# Patient Record
Sex: MALE | Race: WHITE | Employment: FULL TIME | URBAN - METROPOLITAN AREA
[De-identification: names, ages, dates, MRNs, and addresses within clinical notes are randomized per-mention and may not be internally consistent; named-entity substitution may affect disease eponyms.]

---

## 2018-01-16 ENCOUNTER — TELEPHONE (OUTPATIENT)
Dept: FAMILY MEDICINE | Facility: CLINIC | Age: 38
End: 2018-01-16

## 2018-01-16 ENCOUNTER — OFFICE VISIT (OUTPATIENT)
Dept: FAMILY MEDICINE | Facility: CLINIC | Age: 38
End: 2018-01-16

## 2018-01-16 VITALS
HEART RATE: 115 BPM | WEIGHT: 210 LBS | SYSTOLIC BLOOD PRESSURE: 125 MMHG | DIASTOLIC BLOOD PRESSURE: 82 MMHG | RESPIRATION RATE: 22 BRPM | OXYGEN SATURATION: 97 % | BODY MASS INDEX: 25.57 KG/M2 | TEMPERATURE: 100.4 F | HEIGHT: 76 IN

## 2018-01-16 DIAGNOSIS — R53.83 FATIGUE, UNSPECIFIED TYPE: ICD-10-CM

## 2018-01-16 DIAGNOSIS — R21 RASH AND NONSPECIFIC SKIN ERUPTION: ICD-10-CM

## 2018-01-16 DIAGNOSIS — R59.0 ADENOPATHY, CERVICAL: ICD-10-CM

## 2018-01-16 DIAGNOSIS — R07.0 THROAT PAIN: Primary | ICD-10-CM

## 2018-01-16 DIAGNOSIS — D72.828 NEUTROPHILIA: ICD-10-CM

## 2018-01-16 LAB
BASOPHILS # BLD AUTO: 0 10E9/L (ref 0–0.2)
BASOPHILS NFR BLD AUTO: 0.2 %
DEPRECATED S PYO AG THROAT QL EIA: NORMAL
DIFFERENTIAL METHOD BLD: ABNORMAL
EOSINOPHIL # BLD AUTO: 0.1 10E9/L (ref 0–0.7)
EOSINOPHIL NFR BLD AUTO: 0.4 %
ERYTHROCYTE [DISTWIDTH] IN BLOOD BY AUTOMATED COUNT: 13.7 % (ref 10–15)
FLUAV+FLUBV AG SPEC QL: NEGATIVE
FLUAV+FLUBV AG SPEC QL: NEGATIVE
HCT VFR BLD AUTO: 42.4 % (ref 40–53)
HGB BLD-MCNC: 14.4 G/DL (ref 13.3–17.7)
LYMPHOCYTES # BLD AUTO: 1.5 10E9/L (ref 0.8–5.3)
LYMPHOCYTES NFR BLD AUTO: 9.4 %
MCH RBC QN AUTO: 32.3 PG (ref 26.5–33)
MCHC RBC AUTO-ENTMCNC: 34 G/DL (ref 31.5–36.5)
MCV RBC AUTO: 95 FL (ref 78–100)
MONOCYTES # BLD AUTO: 1.9 10E9/L (ref 0–1.3)
MONOCYTES NFR BLD AUTO: 11.8 %
NEUTROPHILS # BLD AUTO: 12.7 10E9/L (ref 1.6–8.3)
NEUTROPHILS NFR BLD AUTO: 78.2 %
PLATELET # BLD AUTO: 275 10E9/L (ref 150–450)
RBC # BLD AUTO: 4.46 10E12/L (ref 4.4–5.9)
SPECIMEN SOURCE: NORMAL
SPECIMEN SOURCE: NORMAL
WBC # BLD AUTO: 16.2 10E9/L (ref 4–11)

## 2018-01-16 PROCEDURE — 99214 OFFICE O/P EST MOD 30 MIN: CPT | Performed by: FAMILY MEDICINE

## 2018-01-16 PROCEDURE — 87081 CULTURE SCREEN ONLY: CPT | Performed by: FAMILY MEDICINE

## 2018-01-16 PROCEDURE — 87880 STREP A ASSAY W/OPTIC: CPT | Performed by: FAMILY MEDICINE

## 2018-01-16 PROCEDURE — 36415 COLL VENOUS BLD VENIPUNCTURE: CPT | Performed by: FAMILY MEDICINE

## 2018-01-16 PROCEDURE — 85025 COMPLETE CBC W/AUTO DIFF WBC: CPT | Performed by: FAMILY MEDICINE

## 2018-01-16 PROCEDURE — 87804 INFLUENZA ASSAY W/OPTIC: CPT | Performed by: FAMILY MEDICINE

## 2018-01-16 RX ORDER — CEFDINIR 300 MG/1
300 CAPSULE ORAL 2 TIMES DAILY
Qty: 20 CAPSULE | Refills: 0 | Status: SHIPPED | OUTPATIENT
Start: 2018-01-16 | End: 2019-03-04

## 2018-01-16 ASSESSMENT — PAIN SCALES - GENERAL: PAINLEVEL: SEVERE PAIN (6)

## 2018-01-16 NOTE — PROGRESS NOTES
"  SUBJECTIVE:   Karthik oSto is a 37 year old male who presents to clinic today for the following health issues:    ENT Symptoms             Symptoms: cc Present Absent Comment   Fever/Chills  x     Fatigue  x     Muscle Aches  x     Eye Irritation   x    Sneezing   x    Nasal Alexis/Drg   x    Sinus Pressure/Pain   x    Loss of smell   x    Dental pain   x    Sore Throat  x     Swollen Glands  x     Ear Pain/Fullness  x     Cough   x    Wheeze   x    Chest Pain   x    Shortness of breath  x     Rash  x     Other   x      Symptom duration:  x2 days    Symptom severity:  mild    Treatments tried:  OTC, Advil   Contacts:  None     Swollen glands (submandibular) x 1 week  Sore throat  Body aches  Rash: erythematous rash in trunk    Urination: normal color, drinking enough.  BM: Normal.    Problem list and histories reviewed & adjusted, as indicated.  Additional history: as documented    Patient Active Problem List   Diagnosis     CARDIOVASCULAR SCREENING; LDL GOAL LESS THAN 160     Genital herpes     Tobacco abuse     History reviewed. No pertinent surgical history.    Social History   Substance Use Topics     Smoking status: Current Every Day Smoker     Types: Cigarettes     Smokeless tobacco: Current User     Alcohol use Yes      Comment: occasional     Family History   Problem Relation Age of Onset     Hypertension Mother      Neurologic Disorder Father      Alzheimer Disease Maternal Grandmother      Hypertension Maternal Grandfather          Reviewed and updated as needed this visit by Provider    ROS:  Cardiovascular, pulmonary, gi and gu systems are negative, except as otherwise noted.    OBJECTIVE:   /82  Pulse 115  Temp 100.4  F (38  C) (Oral)  Resp 22  Ht 6' 3.59\" (1.92 m)  Wt 210 lb (95.3 kg)  SpO2 97%  BMI 25.84 kg/m2  Body mass index is 25.84 kg/(m^2).  GENERAL: healthy, alert and no distress  THROAT: Oropharyngeal erythema  NECK: non tender firm matted submandibular adenopathy.  RESP: lungs " clear to auscultation - no rales, rhonchi or wheezes  CV: regular rate and rhythm, no murmur, click or rub, no peripheral edema   ABDOMEN: soft, nontender, no masses and bowel sounds normal  MS: no gross musculoskeletal defects noted, no edema  Results for orders placed or performed in visit on 01/16/18   CBC with platelets differential   Result Value Ref Range    WBC 16.2 (H) 4.0 - 11.0 10e9/L    RBC Count 4.46 4.4 - 5.9 10e12/L    Hemoglobin 14.4 13.3 - 17.7 g/dL    Hematocrit 42.4 40.0 - 53.0 %    MCV 95 78 - 100 fl    MCH 32.3 26.5 - 33.0 pg    MCHC 34.0 31.5 - 36.5 g/dL    RDW 13.7 10.0 - 15.0 %    Platelet Count 275 150 - 450 10e9/L    Diff Method Automated Method     % Neutrophils 78.2 %    % Lymphocytes 9.4 %    % Monocytes 11.8 %    % Eosinophils 0.4 %    % Basophils 0.2 %    Absolute Neutrophil 12.7 (H) 1.6 - 8.3 10e9/L    Absolute Lymphocytes 1.5 0.8 - 5.3 10e9/L    Absolute Monocytes 1.9 (H) 0.0 - 1.3 10e9/L    Absolute Eosinophils 0.1 0.0 - 0.7 10e9/L    Absolute Basophils 0.0 0.0 - 0.2 10e9/L   Influenza A/B antigen   Result Value Ref Range    Influenza A/B Agn Specimen Nasal     Influenza A Negative NEG^Negative    Influenza B Negative NEG^Negative   Rapid strep screen   Result Value Ref Range    Specimen Description Throat     Rapid Strep A Screen       NEGATIVE: No Group A streptococcal antigen detected by immunoassay, await culture report.       ASSESSMENT/PLAN:     (R07.0) Throat pain  (primary encounter diagnosis)  Comment: Differentials: Strep, Mono, tonsillitis, Viral. RSS negative  Plan: Rapid strep screen, Beta strep group A culture,        CBC with platelets differential, cefdinir         (OMNICEF) 300 MG capsule    (R53.83) Fatigue  Comment: Influenza A/B negative  Plan: Influenza A/B antigen    (R21) Rash and nonspecific skin eruption  Comment: From infectious source, given fever, likely viral but with neutrophilia possibly bacterial. Asymptomatic, treat underlying cause  Plan: CBC with  platelets differential    (R59.0) Adenopathy, cervical  Comment: Infectious; viral vs bacterial or reactive.  Plan: Antibiotic treatment    (D72.9) Neutrophilia  Comment: Indicating a bacterial process. Discussed empiric treatment with Omnicef  Plan: cefdinir (OMNICEF) 300 MG capsule      Call or return to clinic prn if these symptoms worsen or fail to improve as anticipated in 3 days.  More than 50% of the time spent with patient on counseling / coordinating his care. Total appointment times was 30 minutes.     Mio Levine MD  [facility]

## 2018-01-16 NOTE — TELEPHONE ENCOUNTER
Patient needs visit, he has not been seen since 2014.  Called patient he has appointment and will come in for visit.   Amaris Linton RN

## 2018-01-16 NOTE — MR AVS SNAPSHOT
After Visit Summary   1/16/2018    Karthik Soto    MRN: [id number]           Patient Information     Date Of Birth          1980        Visit Information        Provider Department      1/16/2018 11:40 AM Mio Levine MD [facility]        Today's Diagnoses     Throat pain    -  1    Fatigue        Rash and nonspecific skin eruption        Adenopathy, cervical          Care Instructions      When You Have a Sore Throat    A sore throat can be painful. There are many reasons why you may have a sore throat. Your healthcare provider will work with you to find the cause of your sore throat. He or she will also find the best treatment for you.  What causes a sore throat?  Sore throats can be caused or worsened by:    Cold or flu viruses    Bacteria    Irritants such as tobacco smoke or air pollution    Acid reflux  A healthy throat  The tonsils are on the sides of the throat near the base of the tongue. They collect viruses and bacteria and help fight infection. The throat (pharynx) is the passage for air. Mucus from the nasal cavity also moves down the passage.  An inflamed throat  The tonsils and pharynx can become inflamed due to a cold or flu virus. Postnasal drip (excess mucus draining from the nasal cavity) can irritate the throat. It can also make the throat or tonsils more likely to be infected by bacteria. Severe, untreated tonsillitis in children or adults can cause a pocket of pus (abscess) to form near the tonsil.  Your evaluation  A medical evaluation can help find the cause of your sore throat. It can also help your healthcare provider choose the best treatment for you. The evaluation may include a health history, physical exam, and diagnostic tests.  Health history  Your healthcare provider may ask you the following:    How long has the sore throat lasted and how have you been treating it?    Do you have any other symptoms, such as body aches, fever, or  cough?    Does your sore throat recur? If so, how often? How many days of school or work have you missed because of a sore throat?    Do you have trouble eating or swallowing?    Have you been told that you snore or have other sleep problems?    Do you have bad breath?    Do you cough up bad-tasting mucus?  Physical exam  During the exam, your healthcare provider checks your ears, nose, and throat for problems. He or she also checks for swelling in the neck, and may listen to your chest.  Possible tests  Other tests your healthcare provider may perform include:    A throat swab to check for bacteria such as streptococcus (the bacteria that causes strep throat)    A blood test to check for mononucleosis (a viral infection)    A chest X-ray to rule out pneumonia, especially if you have a cough  Treating a sore throat  Treatment depends on many factors. What is the likely cause? Is the problem recent? Does it keep coming back? In many cases, the best thing to do is to treat the symptoms, rest, and let the problem heal itself. Antibiotics may help clear up some bacterial infections. For cases of severe or recurring tonsillitis, the tonsils may need to be removed.  Relieving your symptoms    Don t smoke, and avoid secondhand smoke.    For children, try throat sprays or Popsicles. Adults and older children may try lozenges.    Drink warm liquids to soothe the throat and help thin mucus. Avoid alcohol, spicy foods, and acidic drinks such as orange juice. These can irritate the throat.    Gargle with warm saltwater (1 teaspoon of salt to 8 ounces of warm water).    Use a humidifier to keep air moist and relieve throat dryness.    Try over-the-counter pain relievers such as acetaminophen or ibuprofen. Use as directed, and don t exceed the recommended dose. Don t give aspirin to children.   Are antibiotics needed?  If your sore throat is due to a bacterial infection, antibiotics may speed healing and prevent complications.  "Although group A streptococcus (\"strep throat\" or GAS) is the major treatable infection for a sore throat, GAS causes only 5% to 15% of sore throats in adults who seek medical care. Most sore throats are caused by cold or flu viruses. And antibiotics don t treat viral illness. In fact, using antibiotics when they re not needed may produce bacteria that are harder to kill. Your healthcare provider will prescribe antibiotics only if he or she thinks they are likely to help.  If antibiotics are prescribed  Take the medicine exactly as directed. Be sure to finish your prescription even if you re feeling better. And be sure to ask your healthcare provider or pharmacist what side effects are common and what to do about them.  Is surgery needed?  In some cases, tonsils need to be removed. This is often done as outpatient (same-day) surgery. Your healthcare provider may advise removing the tonsils in cases of:    Several severe bouts of tonsillitis in a year.  Severe  episodes include those that lead to missed days of school or work, or that need to be treated with antibiotics.    Tonsillitis that causes breathing problems during sleep    Tonsillitis caused by food particles collecting in pouches in the tonsils (cryptic tonsillitis)  Call your healthcare provider if any of the following occur:    Symptoms worsen, or new symptoms develop.    Swollen tonsils make breathing difficult.    The pain is severe enough to keep you from drinking liquids.    A skin rash, hives, or wheezing develops. Any of these could signal an allergic reaction to antibiotics.    Symptoms don t improve within a week.    Symptoms don t improve within 2 to 3 days of starting antibiotics.   Date Last Reviewed: 10/1/2016    0573-4900 [employer]. [address] [place] [address], [place] [address]. All rights reserved. This information is not intended as a substitute for professional medical care. Always follow your healthcare professional's " instructions.      [facility]    If you have any questions regarding to your visit please contact your care team:       Team Purple:   Clinic Hours Telephone Number   Dr. Eli Bates   7am-7pm  Monday - Thursday   7am-5pm  Fridays  [phone]  (Appointment scheduling available 24/7)    Questions about your Visit?   Team Line:  [phone]   Urgent Care - [place] and [facility] - 11am-9pm Monday-Friday Saturday-Sunday- 9am-5pm   [place] - 5pm-9pm Monday-Friday Saturday-Sunday- 9am-5pm  [phone] - [facility]  [phone] - [place]       What options do I have for visits at the clinic other than the traditional office visit?  To expand how we care for you, many of our providers are utilizing electronic visits (e-visits) and telephone visits, when medically appropriate, for interactions with their patients rather than a visit in the clinic.   We also offer nurse visits for many medical concerns. Just like any other service, we will bill your insurance company for this type of visit based on time spent on the phone with your provider. Not all insurance companies cover these visits. Please check with your medical insurance if this type of visit is covered. You will be responsible for any charges that are not paid by your insurance.      E-visits via CanoP:  generally incur a $35.00 fee.  Telephone visits:  Time spent on the phone: *charged based on time that is spent on the phone in increments of 10 minutes. Estimated cost:   5-10 mins $30.00   11-20 mins. $59.00   21-30 mins. $85.00     Use Yecurishart (secure email communication and access to your chart) to send your primary care provider a message or make an appointment. Ask someone on your Team how to sign up for CanoP.  For a Price Quote for your services, please call our Consumer Price Line at [phone].  As always, Thank you for trusting us with your health  "care needs!    Discharge by FRANCE NAVA             Follow-ups after your visit        Who to contact     If you have questions or need follow up information about today's clinic visit or your schedule please contact [facility] directly at [phone].  Normal or non-critical lab and imaging results will be communicated to you by MyChart, letter or phone within 4 business days after the clinic has received the results. If you do not hear from us within 7 days, please contact the clinic through MyChart or phone. If you have a critical or abnormal lab result, we will notify you by phone as soon as possible.  Submit refill requests through Mamapedia or call your pharmacy and they will forward the refill request to us. Please allow 3 business days for your refill to be completed.          Additional Information About Your Visit        MyCharSingWho Information     Mamapedia lets you send messages to your doctor, view your test results, renew your prescriptions, schedule appointments and more. To sign up, go to [email or web address] . Click on \"Log in\" on the left side of the screen, which will take you to the Welcome page. Then click on \"Sign up Now\" on the right side of the page.     You will be asked to enter the access code listed below, as well as some personal information. Please follow the directions to create your username and password.     Your access code is: [email or web address]  Expires: 2018 12:41 PM     Your access code will  in 90 days. If you need help or a new code, please call your [place] clinic or [phone].        Care EveryWhere ID     This is your Care EveryWhere ID. This could be used by other organizations to access your [place] medical records  [id number]        Your Vitals Were     Pulse Temperature Respirations Height Pulse Oximetry BMI (Body Mass Index)    115 100.4  F (38  C) (Oral) 22 6' 3.59\" (1.92 m) 97% 25.84 kg/m2       Blood Pressure from Last 3 Encounters:   14 " 134/76   04/20/11 120/90    Weight from Last 3 Encounters:   01/16/18 210 lb (95.3 kg)   02/27/14 213 lb (96.6 kg)   04/20/11 194 lb (88 kg)              We Performed the Following     Beta strep group A culture     CBC with platelets differential     Influenza A/B antigen     Rapid strep screen        Primary Care Provider Office Phone # Fax #    Kimberly Sue Tatton, [phone]       [facility] PHYSIC[facility] [address]  [facility] [address]        Equal Access to Services     [facility]: Hadii davi martinez hadasho Soomaali, waaxda luqadaha, qaybta kaalmada adeegyaadan, jennifer carmen . So [facility] [phone].    ATENCIÓN: Si habla español, tiene a roldan disposición servicios gratuitos de asistencia lingüística. [facility] [phone].    We comply with applicable federal civil rights laws and [place] laws. We do not discriminate on the basis of race, color, national origin, age, disability, sex, sexual orientation, or gender identity.            Thank you!     [facility] you [facility]  [facility]. Our goal is always to provide you with excellent care. Hearing back from our patients is one way we can continue to improve our services. Please take a few minutes to complete the written survey that you may receive in the mail after your visit with us. Thank you!             Your Updated Medication List - Protect others around you: Learn how to safely use, store and throw away your medicines at www.disposemymeds.org.          This list is accurate as of: 1/16/18 12:41 PM.  Always use your most recent med list.                   Brand Name Dispense Instructions for use Diagnosis    PERCOCET 5-325 MG per tablet   Generic drug:  oxyCODONE-acetaminophen      Take by mouth every 4 hours as needed        valACYclovir 500 MG tablet    VALTREX    30 tablet    Take 1 tablet by mouth daily. Fill generic if able    Genital herpes

## 2018-01-16 NOTE — TELEPHONE ENCOUNTER
Routing to provider for result note.     Office Visit on 01/16/2018   Component Date Value Ref Range Status     Influenza A/B Agn Specimen 01/16/2018 Nasal   Final     Influenza A 01/16/2018 Negative  NEG^Negative Final     Influenza B 01/16/2018 Negative  NEG^Negative Final    Comment: Test results must be correlated with clinical data. If necessary, results   should be confirmed by a molecular assay or viral culture.       Specimen Description 01/16/2018 Throat   Final     Rapid Strep A Screen 01/16/2018 NEGATIVE: No Group A streptococcal antigen detected by immunoassay, await culture report.   Final     WBC 01/16/2018 16.2* 4.0 - 11.0 10e9/L Final     RBC Count 01/16/2018 4.46  4.4 - 5.9 10e12/L Final     Hemoglobin 01/16/2018 14.4  13.3 - 17.7 g/dL Final     Hematocrit 01/16/2018 42.4  40.0 - 53.0 % Final     MCV 01/16/2018 95  78 - 100 fl Final     MCH 01/16/2018 32.3  26.5 - 33.0 pg Final     MCHC 01/16/2018 34.0  31.5 - 36.5 g/dL Final     RDW 01/16/2018 13.7  10.0 - 15.0 % Final     Platelet Count 01/16/2018 275  150 - 450 10e9/L Final     Diff Method 01/16/2018 Automated Method   Final     % Neutrophils 01/16/2018 78.2  % Final     % Lymphocytes 01/16/2018 9.4  % Final     % Monocytes 01/16/2018 11.8  % Final     % Eosinophils 01/16/2018 0.4  % Final     % Basophils 01/16/2018 0.2  % Final     Absolute Neutrophil 01/16/2018 12.7* 1.6 - 8.3 10e9/L Final     Absolute Lymphocytes 01/16/2018 1.5  0.8 - 5.3 10e9/L Final     Absolute Monocytes 01/16/2018 1.9* 0.0 - 1.3 10e9/L Final     Absolute Eosinophils 01/16/2018 0.1  0.0 - 0.7 10e9/L Final       Absolute Basophils 01/16/2018 0.0  0.0 - 0.2 10e9/L Final     Kristan Ruvalcaba RN

## 2018-01-16 NOTE — TELEPHONE ENCOUNTER
Reason for Call:  Request for results:    Name of test or procedure: labs     Date of test of procedure: today    Location of the test or procedure[facility]    OK to leave the result message on voice mail or with a family member? YES    Phone number Patient can be reached at:  Home number on file [phone] (home)    Additional comments: patient calling to check the status of the results. Please advise.    Call taken on 1/16/2018 at 3:19 PM by Franca Sosa

## 2018-01-16 NOTE — TELEPHONE ENCOUNTER
Reason for call:  Other   Patient called regarding (reason for call): call back  Additional comments: Calling for appointment/Flu/Cold symptoms - sent to RN TRIAGE      Phone number to reach patient:  Home number on file [phone] (home)    Best Time:  TRIAGE    Can we leave a detailed message on this number?  YES

## 2018-01-16 NOTE — PATIENT INSTRUCTIONS
When You Have a Sore Throat    A sore throat can be painful. There are many reasons why you may have a sore throat. Your healthcare provider will work with you to find the cause of your sore throat. He or she will also find the best treatment for you.  What causes a sore throat?  Sore throats can be caused or worsened by:    Cold or flu viruses    Bacteria    Irritants such as tobacco smoke or air pollution    Acid reflux  A healthy throat  The tonsils are on the sides of the throat near the base of the tongue. They collect viruses and bacteria and help fight infection. The throat (pharynx) is the passage for air. Mucus from the nasal cavity also moves down the passage.  An inflamed throat  The tonsils and pharynx can become inflamed due to a cold or flu virus. Postnasal drip (excess mucus draining from the nasal cavity) can irritate the throat. It can also make the throat or tonsils more likely to be infected by bacteria. Severe, untreated tonsillitis in children or adults can cause a pocket of pus (abscess) to form near the tonsil.  Your evaluation  A medical evaluation can help find the cause of your sore throat. It can also help your healthcare provider choose the best treatment for you. The evaluation may include a health history, physical exam, and diagnostic tests.  Health history  Your healthcare provider may ask you the following:    How long has the sore throat lasted and how have you been treating it?    Do you have any other symptoms, such as body aches, fever, or cough?    Does your sore throat recur? If so, how often? How many days of school or work have you missed because of a sore throat?    Do you have trouble eating or swallowing?    Have you been told that you snore or have other sleep problems?    Do you have bad breath?    Do you cough up bad-tasting mucus?  Physical exam  During the exam, your healthcare provider checks your ears, nose, and throat for problems. He or she also checks for  "swelling in the neck, and may listen to your chest.  Possible tests  Other tests your healthcare provider may perform include:    A throat swab to check for bacteria such as streptococcus (the bacteria that causes strep throat)    A blood test to check for mononucleosis (a viral infection)    A chest X-ray to rule out pneumonia, especially if you have a cough  Treating a sore throat  Treatment depends on many factors. What is the likely cause? Is the problem recent? Does it keep coming back? In many cases, the best thing to do is to treat the symptoms, rest, and let the problem heal itself. Antibiotics may help clear up some bacterial infections. For cases of severe or recurring tonsillitis, the tonsils may need to be removed.  Relieving your symptoms    Don t smoke, and avoid secondhand smoke.    For children, try throat sprays or Popsicles. Adults and older children may try lozenges.    Drink warm liquids to soothe the throat and help thin mucus. Avoid alcohol, spicy foods, and acidic drinks such as orange juice. These can irritate the throat.    Gargle with warm saltwater (1 teaspoon of salt to 8 ounces of warm water).    Use a humidifier to keep air moist and relieve throat dryness.    Try over-the-counter pain relievers such as acetaminophen or ibuprofen. Use as directed, and don t exceed the recommended dose. Don t give aspirin to children.   Are antibiotics needed?  If your sore throat is due to a bacterial infection, antibiotics may speed healing and prevent complications. Although group A streptococcus (\"strep throat\" or GAS) is the major treatable infection for a sore throat, GAS causes only 5% to 15% of sore throats in adults who seek medical care. Most sore throats are caused by cold or flu viruses. And antibiotics don t treat viral illness. In fact, using antibiotics when they re not needed may produce bacteria that are harder to kill. Your healthcare provider will prescribe antibiotics only if he or " she thinks they are likely to help.  If antibiotics are prescribed  Take the medicine exactly as directed. Be sure to finish your prescription even if you re feeling better. And be sure to ask your healthcare provider or pharmacist what side effects are common and what to do about them.  Is surgery needed?  In some cases, tonsils need to be removed. This is often done as outpatient (same-day) surgery. Your healthcare provider may advise removing the tonsils in cases of:    Several severe bouts of tonsillitis in a year.  Severe  episodes include those that lead to missed days of school or work, or that need to be treated with antibiotics.    Tonsillitis that causes breathing problems during sleep    Tonsillitis caused by food particles collecting in pouches in the tonsils (cryptic tonsillitis)  Call your healthcare provider if any of the following occur:    Symptoms worsen, or new symptoms develop.    Swollen tonsils make breathing difficult.    The pain is severe enough to keep you from drinking liquids.    A skin rash, hives, or wheezing develops. Any of these could signal an allergic reaction to antibiotics.    Symptoms don t improve within a week.    Symptoms don t improve within 2 to 3 days of starting antibiotics.   Date Last Reviewed: 10/1/2016    6615-2236 [employer]. [address] [place] [address]. All rights reserved. This information is not intended as a substitute for professional medical care. Always follow your healthcare professional's instructions.      [facility]    If you have any questions regarding to your visit please contact your care team:       Team Purple:   Clinic Hours Telephone Number   Dr. Eli Bates   7am-7pm  Monday - Thursday   7am-5pm  Fridays  [phone]  (Appointment scheduling available 24/7)    Questions about your Visit?   Team Line:  [phone]   [facility]gent [facility] - [place] - 11am-9pm Monday-Friday Saturday-Sunday- 9am-5pm   [place] - 5pm-9pm Monday-Friday Saturday-Sunday- 9am-5pm  [phone] - [place]   [phone] - [place]       What options do I have for visits at the clinic other than the traditional office visit?  To expand how we care for you, many of our providers are utilizing electronic visits (e-visits) and telephone visits, when medically appropriate, for interactions with their patients rather than a visit in the clinic.   We also offer nurse visits for many medical concerns. Just like any other service, we will bill your insurance company for this type of visit based on time spent on the phone with your provider. Not all insurance companies cover these visits. Please check with your medical insurance if this type of visit is covered. You will be responsible for any charges that are not paid by your insurance.      E-visits via [employer]:  generally incur a $35.00 fee.  Telephone visits:  Time spent on the phone: *charged based on time that is spent on the phone in increments of 10 minutes. Estimated cost:   5-10 mins $30.00   11-20 mins. $59.00   21-30 mins. $85.00     Use [employer] (secure email communication and access to your chart) to send your primary care provider a message or make an appointment. Ask someone on your Team how to sign up for [employer].  For a Price Quote for your services, please call our Consumer Price Line at [phone].  As always, Thank you for trusting us with your health care needs!    Discharge by FRANCE NAVA

## 2018-01-17 LAB
BACTERIA SPEC CULT: NORMAL
SPECIMEN SOURCE: NORMAL

## 2018-01-30 ENCOUNTER — TELEPHONE (OUTPATIENT)
Dept: FAMILY MEDICINE | Facility: CLINIC | Age: 38
End: 2018-01-30

## 2018-01-30 NOTE — TELEPHONE ENCOUNTER
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    Additional comments: please call patient to discuss further    Phone number to reach patient:  Home number on file [phone] (home)    Best Time:  anytime    Can we leave a detailed message on this number?  YES

## 2019-03-04 ENCOUNTER — OFFICE VISIT (OUTPATIENT)
Dept: FAMILY MEDICINE | Facility: CLINIC | Age: 39
End: 2019-03-04
Payer: COMMERCIAL

## 2019-03-04 VITALS
RESPIRATION RATE: 20 BRPM | OXYGEN SATURATION: 99 % | WEIGHT: 213 LBS | SYSTOLIC BLOOD PRESSURE: 148 MMHG | HEIGHT: 76 IN | TEMPERATURE: 98.6 F | HEART RATE: 99 BPM | DIASTOLIC BLOOD PRESSURE: 108 MMHG | BODY MASS INDEX: 25.94 KG/M2

## 2019-03-04 DIAGNOSIS — K11.20 PAROTITIS: Primary | ICD-10-CM

## 2019-03-04 PROCEDURE — 99213 OFFICE O/P EST LOW 20 MIN: CPT | Performed by: NURSE PRACTITIONER

## 2019-03-04 RX ORDER — CIPROFLOXACIN 500 MG/1
500 TABLET, FILM COATED ORAL 2 TIMES DAILY
Qty: 20 TABLET | Refills: 0 | Status: SHIPPED | OUTPATIENT
Start: 2019-03-04 | End: 2019-03-14

## 2019-03-04 RX ORDER — CLINDAMYCIN HCL 300 MG
300 CAPSULE ORAL 3 TIMES DAILY
Qty: 30 CAPSULE | Refills: 0 | Status: SHIPPED | OUTPATIENT
Start: 2019-03-04 | End: 2019-03-14

## 2019-03-04 ASSESSMENT — MIFFLIN-ST. JEOR: SCORE: 1981.63

## 2019-03-04 NOTE — PROGRESS NOTES
"  SUBJECTIVE:   Karthik Soto is a 38 year old male who presents to clinic today for the following health issues:    Concern - Swollen face  Onset: x 3 days    Description:   Left side of face in front and in front and underneath left ear.    Intensity: mild. Kept him up last night.    Progression of Symptoms:  worsening    Accompanying Signs & Symptoms:  Swollen, red, about the size of an egg. Patient states it goes in deep.    Previous history of similar problem:   no    Precipitating factors:   Worsened by: nothing    Alleviating factors:  Improved by: nothing    Therapies Tried and outcome: Advil- didn't do anything.      Problem list and histories reviewed & adjusted, as indicated.  Additional history: as documented    Labs reviewed in EPIC    Reviewed and updated as needed this visit by clinical staff  Tobacco  Allergies  Meds  Med Hx  Surg Hx  Fam Hx  Soc Hx      Reviewed and updated as needed this visit by Provider         ROS:  Constitutional, HEENT, cardiovascular, pulmonary, gi and gu systems are negative, except as otherwise noted.    OBJECTIVE:     BP (!) 148/108   Pulse 99   Temp 98.6  F (37  C) (Tympanic)   Resp 20   Ht 1.937 m (6' 4.25\")   Wt 96.6 kg (213 lb)   SpO2 99%   BMI 25.76 kg/m    Body mass index is 25.76 kg/m .  GENERAL: healthy, alert and no distress  HENT: ear canals and TM's normal, nose and mouth without ulcers or lesions. Left periauricular edema and erythema, tender to palpation  NECK: no adenopathy, no asymmetry, masses, or scars and thyroid normal to palpation  RESP: lungs clear to auscultation - no rales, rhonchi or wheezes  PSYCH: mentation appears normal, affect normal/bright    Diagnostic Test Results:  none     ASSESSMENT/PLAN:     1. Parotitis  -symptoms consistent with parotitis, recommended to start Cleocin, if no improvement in 24 hrs add Cipro to cover oral microflora. If no improvement, or if develop fevers, difficulties swallowing recommended ER evaluation "   - clindamycin (CLEOCIN) 300 MG capsule; Take 1 capsule (300 mg) by mouth 3 times daily for 10 days  Dispense: 30 capsule; Refill: 0  - ciprofloxacin (CIPRO) 500 MG tablet; Take 1 tablet (500 mg) by mouth 2 times daily for 10 days  Dispense: 20 tablet; Refill: 0  -probiotics while on antibiotic     See Patient Instructions    OMAYRA Rollins [facility]

## 2019-03-04 NOTE — PATIENT INSTRUCTIONS
"Start Cleocin 300 mg 3 times daily for 10 days  Probiotics: culturelle 1 capsule twice daily 2 hrs before or after antibiotic     If no improvement, or if develop fevers, swallowing problems please go to ER     Start Cleocin first if no improvement in 24 hrs add Cipro         Patient Education     Salivary Gland Infection  Salivary glands make saliva. Saliva is mostly water. It also has minerals and proteins that help break down food and keep the mouth and teeth healthy. There are 3 pairs of salivary glands:    Parotid glands (in front of the ear)    Submandibular glands (below the jaw)    Sublingual glands (below the tongue)  A salivary gland can become infected by bacteria (germs). Things that make this more likely include dehydration and taking medicines that affect saliva flow. Infection is also more likely when the tube (duct) that carries saliva from the gland to the mouth is blocked. It may be blocked by a salivary gland \"stone.\" This is a collection of minerals that forms in the salivary gland.  Signs of infection include fever, severe pain in the gland, and redness and swelling over the gland. It may hurt to open the mouth. Symptoms may be worse when the flow of saliva is stimulated, such as by the smell of food.   Antibiotics are used to treat the infection. Drainage of the infection with a simple surgery may be needed. If you have a salivary gland stone, a procedure may be done to remove it.  Home Care:    Take antibiotics as directed until they are finished. Do this even if you start to feel better after only a few days.    Unless another medicine was prescribed, take over-the-counter medicines, such as acetaminophen or ibuprofen, to help relieve pain.    Moist heat can also help relieve swelling and pain. Wet a cloth with warm water and put it over the sore gland for 10-15 minutes several times a day.    Gently massage the gland a few times a day.     Suck on lemon or other tart hard candies to cause " flow of saliva.  To help prevent stones and infections:    Drink 6-8 glasses of fluid per day (such as water, tea, and clear soup) to keep well hydrated.    If you smoke, ask your healthcare provider for help to quit. Smoking makes salivary gland stones more likely.    Keep good dental hygiene. Brush and floss your teeth daily. See your dentist for regular cleanings.  Follow-up care  Follow up with your healthcare provider or as advised.   When to seek medical advice  Call your healthcare provider if any of the following occur:    Fever over 100.4 F (38 C) after 2 days of taking antibiotics    Symptoms that get worse or don't get better in a few days    Trouble breathing or swallowing  Date Last Reviewed: 10/1/2017    0352-2542 [employer]. [address] [place] [address], [place] [address]. All rights reserved. This information is not intended as a substitute for professional medical care. Always follow your healthcare professional's instructions.

## 2019-03-04 NOTE — LETTER
[facility]  [address] [address]  Phone: [phone]    March 4, 2019        Karthik Soto  [address]   [facility] [place] [facility] [address]          To whom it may concern:    RE: Karthik WITT Soto    Patient was seen and treated today at our clinic and missed work.  Patient may return to work 3/04/2019 with the following:  No restrictions    Please contact me for questions or concerns.      Sincerely,        OMAYRA Rollins CNP

## 2021-08-20 ENCOUNTER — APPOINTMENT (OUTPATIENT)
Dept: GENERAL RADIOLOGY | Facility: CLINIC | Age: 41
End: 2021-08-20
Attending: PHYSICIAN ASSISTANT
Payer: COMMERCIAL

## 2021-08-20 ENCOUNTER — HOSPITAL ENCOUNTER (EMERGENCY)
Facility: CLINIC | Age: 41
Discharge: HOME | End: 2021-08-20
Attending: PHYSICIAN ASSISTANT | Admitting: PHYSICIAN ASSISTANT
Payer: COMMERCIAL

## 2021-08-20 VITALS
HEART RATE: 85 BPM | WEIGHT: 225 LBS | TEMPERATURE: 98 F | RESPIRATION RATE: 16 BRPM | BODY MASS INDEX: 27.21 KG/M2 | SYSTOLIC BLOOD PRESSURE: 178 MMHG | OXYGEN SATURATION: 98 % | DIASTOLIC BLOOD PRESSURE: 148 MMHG

## 2021-08-20 DIAGNOSIS — M25.462 EFFUSION OF LEFT KNEE: ICD-10-CM

## 2021-08-20 PROCEDURE — 73562 X-RAY EXAM OF KNEE 3: CPT | Mod: LT

## 2021-08-20 PROCEDURE — G0463 HOSPITAL OUTPT CLINIC VISIT: HCPCS | Performed by: PHYSICIAN ASSISTANT

## 2021-08-20 PROCEDURE — 99213 OFFICE O/P EST LOW 20 MIN: CPT | Performed by: PHYSICIAN ASSISTANT

## 2021-08-20 NOTE — Clinical Note
Karthik Soto was seen and treated in our emergency department on 8/20/2021.    He should rest the left knee with minimal activity only as tolerated by brace use for the next 7 days or until his next follow-up appointment.  During this time he needs to avoid any kneeling/squatting and may need to avoid periods of prolonged standing.  He can perform sitting duties without restrictions.      Sincerely,     [facility]

## 2021-08-20 NOTE — ED TRIAGE NOTES
Triage nurse spoke with UC provider about pt's bp. Plan to see pt in UC for L knee and and recheck BP since pt is asymptomatic to HTN.

## 2021-08-20 NOTE — ED PROVIDER NOTES
History     Chief Complaint   Patient presents with     Knee Pain     L knee pain, felt a pop yesterday and is now partial WB     HPI  Karthik Soto is a 41 year old male who presents to the urgent care with concern over left knee pain.  Patient reports he is ongoing longstanding history of left knee pain that he attributed to his work as a  however in the last 24 hours he stepped irregularly and felt a popping sensation in his knee and had dramatic increase in swelling.  He denies any erythema ecchymosis lacerations or skin changes.  No distal numbness or paresthesias.  He has attempted to treat with kineseotape and CBD without relief.      Allergies:  Allergies   Allergen Reactions     Pcn [Penicillins]      Problem List:    Patient Active Problem List    Diagnosis Date Noted     Tobacco abuse 10/04/2011     Priority: Medium     Genital herpes      Priority: Medium     IMO update changed this record. Please review for accuracy       CARDIOVASCULAR SCREENING; LDL GOAL LESS THAN 160 04/20/2011     Priority: Medium      Past Medical History:    Past Medical History:   Diagnosis Date     Genital herpes      Kidney stone 2011     Past Surgical History:    No past surgical history on file.    Family History:    Family History   Problem Relation Age of Onset     Hypertension Mother      Neurologic Disorder Father      Alzheimer Disease Maternal Grandmother      Hypertension Maternal Grandfather      Social History:  Marital Status:  Single [1]  Social History     Tobacco Use     Smoking status: Current Every Day Smoker     Types: Cigarettes     Smokeless tobacco: Current User   Substance Use Topics     Alcohol use: Yes     Comment: occasional     Drug use: No      Medications:    ValACYclovir (VALTREX) 500 MG tablet      Review of Systems  CONSTITUTIONAL:NEGATIVE for fever, chills, change in weight  INTEGUMENTARY/SKIN: NEGATIVE for worrisome rashes, moles or lesions  RESP:NEGATIVE for significant cough or  SOB  MUSCULOSKELETAL: POSITIVE  for left knee pain, swelling and NEGATIVE for other concerning arthralgias or myalgias  NEURO: NEGATIVE for numbness, paresthesias   Physical Exam   BP: (S) (!) 178/148  Pulse: 85  Temp: 98  F (36.7  C)  Resp: 16  Weight: 102.1 kg (225 lb)  SpO2: 98 %  Physical Exam  HENT:      Head: Normocephalic and atraumatic.   Cardiovascular:      Pulses:           Posterior tibial pulses are 2+ on the left side.   Musculoskeletal:      Left knee: Swelling and effusion present. No deformity, erythema, ecchymosis, lacerations, bony tenderness or crepitus. Decreased range of motion (secondary to swelling). Tenderness present. No LCL laxity, MCL laxity, ACL laxity or PCL laxity.     Left lower leg: Normal.      Left ankle: Normal.   Skin:     General: Skin is warm and dry.      Capillary Refill: Capillary refill takes less than 2 seconds.      Findings: No abrasion, ecchymosis, erythema, laceration, rash or wound.   Neurological:      Mental Status: He is alert.      Sensory: No sensory deficit.         ED Course        Procedures       Critical Care time:  none        Results for orders placed or performed during the hospital encounter of 08/20/21 (from the past 24 hour(s))   XR Knee Left 3 Views    Narrative    XR KNEE LEFT 3 VIEWS   8/20/2021 1:06 PM     HISTORY:  pain, unable to fully bare weight      Impression    IMPRESSION: Joint effusion. Otherwise unremarkable. No fracture  identified.     SAMINA BAXTER MD         SYSTEM ID:  [id number]     Medications - No data to display    Assessments & Plan (with Medical Decision Making)     I have reviewed the nursing notes.  I have reviewed the findings, diagnosis, plan and need for follow up with the patient.     Discharge Medication List as of 8/20/2021  1:37 PM        Final diagnoses:   Effusion of left knee     41-year-old male presents to urgent care with concern over ongoing left knee pain worsened in the last 24 hours after he stepped irregularly and  felt a popping sensation.  He had significantly elevated blood pressure upon arrival, remainder of vital signs stable.  Physical exam findings were significant for left knee effusion, tenderness palpation, decreased range of motion.  No ligamentous instability.  He did have x-ray which was negative for acute fracture.  Differential for symptoms include ongoing osteoarthritis, meniscal injury, muscle strain.  He was discharged home stable with instructions for symptomatic treatment with rest, ice, Tylenol/ibuprofen as tolerated, hinged knee brace given.  Follow up with ortho for further evaluation,  referral placed. Worrisome reasons to return to ER/UC sooner discussed.     Disclaimer: This note consists of symbols derived from keyboarding, dictation, and/or voice recognition software. As a result, there may be errors in the script that have gone undetected.  Please consider this when interpreting information found in the chart.    8/20/2021   [facility] EMERGENCY DEPT     Alyson Damon PA-C  08/22/21 [id number]

## 2021-08-26 ENCOUNTER — HOSPITAL ENCOUNTER (OUTPATIENT)
Dept: MRI IMAGING | Facility: CLINIC | Age: 41
Discharge: HOME | End: 2021-08-26
Attending: FAMILY MEDICINE | Admitting: FAMILY MEDICINE
Payer: COMMERCIAL

## 2021-08-26 ENCOUNTER — OFFICE VISIT (OUTPATIENT)
Dept: ORTHOPEDICS | Facility: CLINIC | Age: 41
End: 2021-08-26
Attending: PHYSICIAN ASSISTANT
Payer: COMMERCIAL

## 2021-08-26 VITALS
BODY MASS INDEX: 27.4 KG/M2 | WEIGHT: 225 LBS | HEIGHT: 76 IN | SYSTOLIC BLOOD PRESSURE: 200 MMHG | DIASTOLIC BLOOD PRESSURE: 133 MMHG

## 2021-08-26 DIAGNOSIS — S89.92XD INJURY OF LEFT KNEE, SUBSEQUENT ENCOUNTER: ICD-10-CM

## 2021-08-26 DIAGNOSIS — M25.462 EFFUSION OF LEFT KNEE: ICD-10-CM

## 2021-08-26 DIAGNOSIS — M25.362 KNEE INSTABILITY, LEFT: ICD-10-CM

## 2021-08-26 DIAGNOSIS — S89.92XD INJURY OF LEFT KNEE, SUBSEQUENT ENCOUNTER: Primary | ICD-10-CM

## 2021-08-26 PROCEDURE — 73721 MRI JNT OF LWR EXTRE W/O DYE: CPT | Mod: 26 | Performed by: RADIOLOGY

## 2021-08-26 PROCEDURE — 73721 MRI JNT OF LWR EXTRE W/O DYE: CPT | Mod: LT

## 2021-08-26 PROCEDURE — 99204 OFFICE O/P NEW MOD 45 MIN: CPT | Performed by: FAMILY MEDICINE

## 2021-08-26 ASSESSMENT — PAIN SCALES - GENERAL: PAINLEVEL: MODERATE PAIN (5)

## 2021-08-26 ASSESSMENT — MIFFLIN-ST. JEOR: SCORE: 2017.09
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
## 2021-09-02 ENCOUNTER — OFFICE VISIT (OUTPATIENT)
Dept: ORTHOPEDICS | Facility: CLINIC | Age: 41
End: 2021-09-02
Payer: COMMERCIAL

## 2021-09-02 VITALS
BODY MASS INDEX: 27.4 KG/M2 | DIASTOLIC BLOOD PRESSURE: 106 MMHG | WEIGHT: 225 LBS | SYSTOLIC BLOOD PRESSURE: 160 MMHG | HEIGHT: 76 IN

## 2021-09-02 DIAGNOSIS — S89.92XD INJURY OF LEFT KNEE, SUBSEQUENT ENCOUNTER: Primary | ICD-10-CM

## 2021-09-02 DIAGNOSIS — M25.462 EFFUSION OF LEFT KNEE: ICD-10-CM

## 2021-09-02 PROCEDURE — 20611 DRAIN/INJ JOINT/BURSA W/US: CPT | Mod: LT | Performed by: FAMILY MEDICINE

## 2021-09-02 RX ADMIN — ROPIVACAINE HYDROCHLORIDE 3 ML: 5 INJECTION, SOLUTION EPIDURAL; INFILTRATION; PERINEURAL at 15:20

## 2021-09-02 RX ADMIN — TRIAMCINOLONE ACETONIDE 40 MG: 40 INJECTION, SUSPENSION INTRA-ARTICULAR; INTRAMUSCULAR at 15:20

## 2021-09-02 ASSESSMENT — MIFFLIN-ST. JEOR: SCORE: 2017.09

## 2021-09-02 NOTE — Clinical Note
2021         RE: Karthik Soto  [address] [facility] [address]        Dear Colleague,    Thank you for referring your patient, Karthik Soto, to the [facility] [place]. Please see a copy of my visit note below.    Karthik Soto  :  1980  DOS: 2021  MRN: [id number]    Sports Medicine Clinic Procedure    Ultrasound Guided Left Intra-Articular Knee Injection, +/- Aspiration    Clinical History: Patient presents for left knee aspiration and steroid injection as discussed.    Diagnosis:   1. Injury of left knee, subsequent encounter    2. Effusion of left knee        Large Joint Injection/Arthocentesis: L knee joint    Date/Time: 2021 3:20 PM  Performed by: Hesham Cope DO  Authorized by: Hesham Cope DO     Indications:  Pain and joint swelling  Needle Size:  21 G  Guidance: ultrasound    Approach:  Superolateral  Location:  Knee      Medications:  3 mL ropivacaine 5 MG/ML; 40 mg triamcinolone 40 MG/ML  Aspirate amount (mL):  4  Aspirate:  Serous and yellow  Outcome:  Tolerated well, no immediate complications  Procedure discussed: discussed risks, benefits, and alternatives    Consent Given by:  Patient  Timeout: timeout called immediately prior to procedure    Prep: patient was prepped and draped in usual sterile fashion        Impression:  Successful Left intra-articular knee injection and aspiration.    Plan:  Follow up prn based on clinical progress  Expectations and goals of CSI reviewed  Often 2-3 days for steroid effect, and can take up to two weeks for maximum effect  We discussed modified progressive pain-free activity as tolerated  Do not overuse in first two weeks if feeling better due to concern for vulnerability while steroid is working  Supportive care reviewed  All questions were answered today  Contact us with additional questions or concerns  Signs and sx of concern reviewed      Hesham Cope DO, CAQ  Primary Care  Sports Medicine  [facility]           Again, thank you for allowing me to participate in the care of your patient.        Sincerely,        Hesham Cope, DO

## 2021-09-02 NOTE — PROGRESS NOTES
Karthik Soto  :  1980  DOS: 2021  MRN: [id number]    Sports Medicine Clinic Procedure    Ultrasound Guided Left Intra-Articular Knee Injection, +/- Aspiration    Clinical History: Patient presents for left knee aspiration and steroid injection as discussed.    Diagnosis:   1. Injury of left knee, subsequent encounter    2. Effusion of left knee        Large Joint Injection/Arthocentesis: L knee joint    Date/Time: 2021 3:20 PM  Performed by: Hesham Cope DO  Authorized by: Hesham Cope DO     Indications:  Pain and joint swelling  Needle Size:  21 G  Guidance: ultrasound    Approach:  Superolateral  Location:  Knee      Medications:  3 mL ropivacaine 5 MG/ML; 40 mg triamcinolone 40 MG/ML  Aspirate amount (mL):  4  Aspirate:  Serous and yellow  Outcome:  Tolerated well, no immediate complications  Procedure discussed: discussed risks, benefits, and alternatives    Consent Given by:  Patient  Timeout: timeout called immediately prior to procedure    Prep: patient was prepped and draped in usual sterile fashion        Impression:  Successful Left intra-articular knee injection and aspiration.    Plan:  Follow up prn based on clinical progress  Expectations and goals of CSI reviewed  Often 2-3 days for steroid effect, and can take up to two weeks for maximum effect  We discussed modified progressive pain-free activity as tolerated  Do not overuse in first two weeks if feeling better due to concern for vulnerability while steroid is working  Supportive care reviewed  All questions were answered today  Contact us with additional questions or concerns  Signs and sx of concern reviewed      Hesham Cope DO, CAINDRA  Primary Care Sports Medicine  [facility]

## 2021-09-06 RX ORDER — TRIAMCINOLONE ACETONIDE 40 MG/ML
40 INJECTION, SUSPENSION INTRA-ARTICULAR; INTRAMUSCULAR
Status: DISCONTINUED | OUTPATIENT
Start: 2021-09-02 | End: 2022-06-02 | Stop reason: ALTCHOICE

## 2021-09-06 RX ORDER — ROPIVACAINE HYDROCHLORIDE 5 MG/ML
3 INJECTION, SOLUTION EPIDURAL; INFILTRATION; PERINEURAL
Status: DISCONTINUED | OUTPATIENT
Start: 2021-09-02 | End: 2022-06-02 | Stop reason: ALTCHOICE

## 2021-12-15 ENCOUNTER — OFFICE VISIT (OUTPATIENT)
Dept: FAMILY MEDICINE | Facility: CLINIC | Age: 41
End: 2021-12-15
Payer: COMMERCIAL

## 2021-12-15 VITALS
TEMPERATURE: 98.4 F | HEART RATE: 94 BPM | BODY MASS INDEX: 24.39 KG/M2 | WEIGHT: 200.4 LBS | DIASTOLIC BLOOD PRESSURE: 130 MMHG | SYSTOLIC BLOOD PRESSURE: 181 MMHG | OXYGEN SATURATION: 97 %

## 2021-12-15 DIAGNOSIS — A60.01 HERPES SIMPLEX INFECTION OF PENIS: ICD-10-CM

## 2021-12-15 DIAGNOSIS — I10 BENIGN ESSENTIAL HYPERTENSION: Primary | ICD-10-CM

## 2021-12-15 LAB
ALBUMIN SERPL-MCNC: 4.3 G/DL (ref 3.5–5)
ALBUMIN UR-MCNC: NEGATIVE MG/DL
ALP SERPL-CCNC: 75 U/L (ref 45–120)
ALT SERPL W P-5'-P-CCNC: 35 U/L (ref 0–45)
ANION GAP SERPL CALCULATED.3IONS-SCNC: 13 MMOL/L (ref 5–18)
APPEARANCE UR: CLEAR
AST SERPL W P-5'-P-CCNC: 30 U/L (ref 0–40)
BILIRUB SERPL-MCNC: 0.5 MG/DL (ref 0–1)
BILIRUB UR QL STRIP: NEGATIVE
BUN SERPL-MCNC: 19 MG/DL (ref 8–22)
CALCIUM SERPL-MCNC: 9.9 MG/DL (ref 8.5–10.5)
CHLORIDE BLD-SCNC: 103 MMOL/L (ref 98–107)
CHOLEST SERPL-MCNC: 273 MG/DL
CO2 SERPL-SCNC: 22 MMOL/L (ref 22–31)
COLOR UR AUTO: YELLOW
CREAT SERPL-MCNC: 1 MG/DL (ref 0.7–1.3)
ERYTHROCYTE [DISTWIDTH] IN BLOOD BY AUTOMATED COUNT: 12.9 % (ref 10–15)
FASTING STATUS PATIENT QL REPORTED: YES
GFR SERPL CREATININE-BSD FRML MDRD: >90 ML/MIN/1.73M2
GLUCOSE BLD-MCNC: 101 MG/DL (ref 70–125)
GLUCOSE UR STRIP-MCNC: NEGATIVE MG/DL
HCT VFR BLD AUTO: 45.5 % (ref 40–53)
HDLC SERPL-MCNC: 72 MG/DL
HGB BLD-MCNC: 15.8 G/DL (ref 13.3–17.7)
HGB UR QL STRIP: NEGATIVE
KETONES UR STRIP-MCNC: NEGATIVE MG/DL
LDLC SERPL CALC-MCNC: 166 MG/DL
LEUKOCYTE ESTERASE UR QL STRIP: NEGATIVE
MCH RBC QN AUTO: 33 PG (ref 26.5–33)
MCHC RBC AUTO-ENTMCNC: 34.7 G/DL (ref 31.5–36.5)
MCV RBC AUTO: 95 FL (ref 78–100)
NITRATE UR QL: NEGATIVE
PH UR STRIP: 6.5 [PH] (ref 5–8)
PLATELET # BLD AUTO: 275 10E3/UL (ref 150–450)
POTASSIUM BLD-SCNC: 4.7 MMOL/L (ref 3.5–5)
PROT SERPL-MCNC: 7.6 G/DL (ref 6–8)
RBC # BLD AUTO: 4.79 10E6/UL (ref 4.4–5.9)
SODIUM SERPL-SCNC: 138 MMOL/L (ref 136–145)
SP GR UR STRIP: 1.02 (ref 1–1.03)
TRIGL SERPL-MCNC: 173 MG/DL
UROBILINOGEN UR STRIP-ACNC: 0.2 E.U./DL
WBC # BLD AUTO: 5.9 10E3/UL (ref 4–11)

## 2021-12-15 PROCEDURE — 99203 OFFICE O/P NEW LOW 30 MIN: CPT | Performed by: FAMILY MEDICINE

## 2021-12-15 PROCEDURE — 85027 COMPLETE CBC AUTOMATED: CPT | Performed by: FAMILY MEDICINE

## 2021-12-15 PROCEDURE — 36415 COLL VENOUS BLD VENIPUNCTURE: CPT | Performed by: FAMILY MEDICINE

## 2021-12-15 PROCEDURE — 80061 LIPID PANEL: CPT | Performed by: FAMILY MEDICINE

## 2021-12-15 PROCEDURE — 81003 URINALYSIS AUTO W/O SCOPE: CPT | Performed by: FAMILY MEDICINE

## 2021-12-15 PROCEDURE — 80053 COMPREHEN METABOLIC PANEL: CPT | Performed by: FAMILY MEDICINE

## 2021-12-15 RX ORDER — VALACYCLOVIR HYDROCHLORIDE 500 MG/1
500 TABLET, FILM COATED ORAL DAILY
Qty: 30 TABLET | Refills: 11 | Status: SHIPPED | OUTPATIENT
Start: 2021-12-15 | End: 2023-06-12

## 2021-12-15 RX ORDER — LISINOPRIL 40 MG/1
20 TABLET ORAL DAILY
Qty: 30 TABLET | Refills: 0 | Status: SHIPPED | OUTPATIENT
Start: 2021-12-15 | End: 2022-03-31 | Stop reason: DRUGHIGH

## 2021-12-15 NOTE — LETTER
December 16, 2021      Karthik WITT Soto  [address] [facility] [place] [address]        Dear ,    We are writing to inform you of your test results.    Test results indicate you may require additional follow up, see comment below.    Resulted Orders   CBC with platelets   Result Value Ref Range    WBC Count 5.9 4.0 - 11.0 10e3/uL    RBC Count 4.79 4.40 - 5.90 10e6/uL    Hemoglobin 15.8 13.3 - 17.7 g/dL    Hematocrit 45.5 40.0 - 53.0 %    MCV 95 78 - 100 fL    MCH 33.0 26.5 - 33.0 pg    MCHC 34.7 31.5 - 36.5 g/dL    RDW 12.9 10.0 - 15.0 %    Platelet Count 275 150 - 450 10e3/uL   Comprehensive metabolic panel   Result Value Ref Range    Sodium 138 136 - 145 mmol/L    Potassium 4.7 3.5 - 5.0 mmol/L    Chloride 103 98 - 107 mmol/L    Carbon Dioxide (CO2) 22 22 - 31 mmol/L    Anion Gap 13 5 - 18 mmol/L    Urea Nitrogen 19 8 - 22 mg/dL    Creatinine 1.00 0.70 - 1.30 mg/dL    Calcium 9.9 8.5 - 10.5 mg/dL    Glucose 101 70 - 125 mg/dL    Alkaline Phosphatase 75 45 - 120 U/L    AST 30 0 - 40 U/L    ALT 35 0 - 45 U/L    Protein Total 7.6 6.0 - 8.0 g/dL    Albumin 4.3 3.5 - 5.0 g/dL    Bilirubin Total 0.5 0.0 - 1.0 mg/dL    GFR Estimate >90 >60 mL/min/1.73m2      Comment:      As of July 11, 2021, eGFR is calculated by the CKD-EPI creatinine equation, without race adjustment. eGFR can be influenced by muscle mass, exercise, and diet. The reported eGFR is an estimation only and is only applicable if the renal function is stable.   Lipid panel reflex to direct LDL Fasting   Result Value Ref Range    Cholesterol 273 (H) <=199 mg/dL    Triglycerides 173 (H) <=149 mg/dL    Direct Measure HDL 72 >=40 mg/dL      Comment:      HDL Cholesterol Reference Range:     0-2 years:   No reference ranges established for patients under 2 years old  at [facility] for lipid analytes.    2-8 years:  Greater than 45 mg/dL     18 years and older:   Female: Greater than or equal to 50 mg/dL   Male:   Greater than or equal to 40 mg/dL     LDL Cholesterol Calculated 166 (H) <=129 mg/dL    Patient Fasting > 8hrs? Yes    UA reflex to Microscopic and Culture   Result Value Ref Range    Color Urine Yellow Colorless, Straw, Light Yellow, Yellow    Appearance Urine Clear Clear    Glucose Urine Negative Negative mg/dL    Bilirubin Urine Negative Negative    Ketones Urine Negative Negative mg/dL    Specific Gravity Urine 1.025 1.005 - 1.030    Blood Urine Negative Negative    pH Urine 6.5 5.0 - 8.0    Protein Albumin Urine Negative Negative mg/dL    Urobilinogen Urine 0.2 0.2, 1.0 E.U./dL    Nitrite Urine Negative Negative    Leukocyte Esterase Urine Negative Negative    Narrative    Microscopic not indicated       If you have any questions or concerns, please call the clinic at the number listed above.     Karthik: Your cholesterol was high we will discuss this when you come back in for follow-up for your high blood pressure  Sincerely,      Heath Singh MD

## 2021-12-15 NOTE — PROGRESS NOTES
"  Assessment & Plan     Benign essential hypertension  During a knee injection the patient was found to be extremely hypertensive; his home readings have been similar to what we got here in the clinic he will need treatment will start him on an ACE inhibitor  - CBC with platelets  - Comprehensive metabolic panel  - Lipid panel reflex to direct LDL Fasting  - UA reflex to Microscopic and Culture  - lisinopril (ZESTRIL) 40 MG tablet  Dispense: 30 tablet; Refill: 0    Herpes simplex infection of penis  Renew the following  - valACYclovir (VALTREX) 500 MG tablet  Dispense: 30 tablet; Refill: 11               Tobacco Cessation:   reports that he has been smoking cigarettes. He uses smokeless tobacco.  Tobacco Cessation Action Plan: Information offered: Patient not interested at this time    BMI:   Estimated body mass index is 24.39 kg/m  as calculated from the following:    Height as of 9/2/21: 1.93 m (6' 4\").    Weight as of this encounter: 90.9 kg (200 lb 6.4 oz).           No follow-ups on file.    Heath Singh MD  [facility]    Cassi Angeles is a 41 year old who presents for the following health issues patient was found to be hypertensive during an orthopedic evaluation.  He has not been seen in primary care for over 10 years.  We see him today blood pressure elevated 180/130.  Cardiovascular exam unremarkable no peripheral edema.  Strong family history heart disease including hypertension.  Patient drinks daily alcohol 3 to 4 ounces per day.  He does smoke he is titrating off he is down to 4 cigarettes we just discussed cutting back on alcohol and smoking cessation.  I Dayan add an ACE inhibitor we will start him at one half of a tablet of the 40 mg lisinopril and slowly titrate him up to maximum dose on this ACE inhibitor and see him for follow-up in 2 weeks.  We will do appropriate laboratory all medical questions asked were answered I will renew his herpes medication.    HPI "           Review of Systems   Constitutional, HEENT, cardiovascular, pulmonary, gi and gu systems are negative, except as otherwise noted.      Objective    BP (!) 181/130 (BP Location: Right arm, Patient Position: Sitting, Cuff Size: Adult Large)   Pulse 94   Temp 98.4  F (36.9  C)   Wt 90.9 kg (200 lb 6.4 oz)   SpO2 97%   BMI 24.39 kg/m    Body mass index is 24.39 kg/m .  Physical Exam   GENERAL: healthy, alert and no distress  NECK: no adenopathy, no asymmetry, masses, or scars and thyroid normal to palpation  RESP: lungs clear to auscultation - no rales, rhonchi or wheezes  CV: regular rate and rhythm, normal S1 S2, no S3 or S4, no murmur, click or rub, no peripheral edema and peripheral pulses strong  ABDOMEN: soft, nontender, no hepatosplenomegaly, no masses and bowel sounds normal  MS: no gross musculoskeletal defects noted, no edema

## 2021-12-15 NOTE — LETTER
[facility]  [address]   [place] [address]  Phone: [phone]  Fax: [phone]    December 15, 2021        Karthik Soto  [address] [facility] [address]          To whom it may concern:    RE: Karthik Soto    Patient was seen today at your clinic, 12/15/2021.    Please contact me for questions or concerns.      Sincerely,        Heath Singh MD

## 2021-12-28 PROBLEM — F12.90 MARIJUANA USE: Status: ACTIVE | Noted: 2021-12-28

## 2021-12-28 PROBLEM — S36.09XA RUPTURED SPLEEN: Status: ACTIVE | Noted: 2021-12-28

## 2021-12-29 ENCOUNTER — OFFICE VISIT (OUTPATIENT)
Dept: FAMILY MEDICINE | Facility: CLINIC | Age: 41
End: 2021-12-29
Payer: COMMERCIAL

## 2021-12-29 VITALS
HEART RATE: 84 BPM | WEIGHT: 202.9 LBS | DIASTOLIC BLOOD PRESSURE: 103 MMHG | SYSTOLIC BLOOD PRESSURE: 150 MMHG | BODY MASS INDEX: 24.7 KG/M2 | OXYGEN SATURATION: 100 %

## 2021-12-29 DIAGNOSIS — I10 BENIGN ESSENTIAL HYPERTENSION: Primary | ICD-10-CM

## 2021-12-29 PROCEDURE — 99214 OFFICE O/P EST MOD 30 MIN: CPT | Performed by: FAMILY MEDICINE

## 2021-12-29 RX ORDER — LISINOPRIL AND HYDROCHLOROTHIAZIDE 12.5; 2 MG/1; MG/1
1 TABLET ORAL DAILY
Qty: 90 TABLET | Refills: 1 | Status: SHIPPED | OUTPATIENT
Start: 2021-12-29 | End: 2022-03-31

## 2021-12-29 NOTE — PROGRESS NOTES
Assessment & Plan     Benign essential hypertension  Discontinue lisinopril; institute therapy with the following medication; recheck patient 2 to 3 months  - lisinopril-hydrochlorothiazide (ZESTORETIC) 20-12.5 MG tablet  Dispense: 90 tablet; Refill: 1                   Return in about 2 months (around 2/28/2022) for Follow up.    Heath Singh MD  [facility]    Cassi Angeles is a 41 year old who presents for the following health issues the good news is that Karthik has cut back on his cigarettes he has probably had 10 cigarettes in the last 2 weeks we congratulated him on that.  His blood pressure has come down systolic now in the 150 range diastolic [id number].  Today's medical decision was to add a thiazide diuretic to his lisinopril.  He works a 12-hour shift he needs to take this first thing in the morning on a regular basis.  He brings in his outside blood pressure readings which are high but his blood pressure cuff was old.  His consort who is in the healthcare industry is ordered a new blood pressure cuff will get 2 readings a day for the next 2 to 3 months I will see him at that time for readjustment.  We congratulated him on a change in his hygiene.  All medical questions asked were answered chart was reviewed today medication adjustment as per above    HPI           Review of Systems   Constitutional, HEENT, cardiovascular, pulmonary, gi and gu systems are negative, except as otherwise noted.      Objective    BP (!) 150/103 (BP Location: Right arm, Patient Position: Sitting, Cuff Size: Adult Large)   Pulse 84   Wt 92 kg (202 lb 14.4 oz)   SpO2 100%   BMI 24.70 kg/m    Body mass index is 24.7 kg/m .  Physical Exam   GENERAL: healthy, alert and no distress  NECK: no adenopathy, no asymmetry, masses, or scars and thyroid normal to palpation  RESP: lungs clear to auscultation - no rales, rhonchi or wheezes  CV: regular rate and rhythm, normal S1 S2, no S3 or S4, no murmur, click  or rub, no peripheral edema and peripheral pulses strong  ABDOMEN: soft, nontender, no hepatosplenomegaly, no masses and bowel sounds normal  MS: no gross musculoskeletal defects noted, no edema

## 2022-03-31 ENCOUNTER — OFFICE VISIT (OUTPATIENT)
Dept: FAMILY MEDICINE | Facility: CLINIC | Age: 42
End: 2022-03-31
Payer: COMMERCIAL

## 2022-03-31 VITALS
SYSTOLIC BLOOD PRESSURE: 144 MMHG | DIASTOLIC BLOOD PRESSURE: 86 MMHG | BODY MASS INDEX: 25.94 KG/M2 | OXYGEN SATURATION: 99 % | WEIGHT: 213 LBS | HEART RATE: 90 BPM | HEIGHT: 76 IN

## 2022-03-31 DIAGNOSIS — I10 BENIGN ESSENTIAL HYPERTENSION: ICD-10-CM

## 2022-03-31 DIAGNOSIS — E87.6 HYPOKALEMIA: Primary | ICD-10-CM

## 2022-03-31 LAB — POTASSIUM BLD-SCNC: 4.2 MMOL/L (ref 3.5–5)

## 2022-03-31 PROCEDURE — 84132 ASSAY OF SERUM POTASSIUM: CPT | Performed by: FAMILY MEDICINE

## 2022-03-31 PROCEDURE — 36415 COLL VENOUS BLD VENIPUNCTURE: CPT | Performed by: FAMILY MEDICINE

## 2022-03-31 PROCEDURE — 99214 OFFICE O/P EST MOD 30 MIN: CPT | Performed by: FAMILY MEDICINE

## 2022-03-31 RX ORDER — LISINOPRIL AND HYDROCHLOROTHIAZIDE 12.5; 2 MG/1; MG/1
1 TABLET ORAL DAILY
Qty: 90 TABLET | Refills: 3 | Status: SHIPPED | OUTPATIENT
Start: 2022-03-31 | End: 2023-06-12

## 2022-03-31 RX ORDER — LISINOPRIL AND HYDROCHLOROTHIAZIDE 12.5; 2 MG/1; MG/1
1 TABLET ORAL DAILY
Qty: 90 TABLET | Refills: 3 | Status: SHIPPED | OUTPATIENT
Start: 2022-03-31 | End: 2022-03-31

## 2022-03-31 NOTE — PROGRESS NOTES
"  Assessment & Plan     Benign essential hypertension  Renew the following  - lisinopril-hydrochlorothiazide (ZESTORETIC) 20-12.5 MG tablet  Dispense: 90 tablet; Refill: 3    Hypokalemia  Patient has some numbness in his fingertips and is more lethargic lately we need to check his potassium level and he will increase his supplementation with oranges potato skins and unfortunately cannot eat bananas because of the chemical spray which she is allergic to  - Potassium                   No follow-ups on file.    Heath Singh MD  [facility]    Cassi Angeles is a 41 year old who presents for the following health issues     HPI       Patient is feeling much better since we get his blood pressure down to manageable levels at 140/84.  He is on lisinopril hydrochlorothiazide 20/12.5.  He is experiencing some numbness in his fingertips occasionally and is a little bit more lethargic and is able to take naps better.  We need to check his potassium level.  He will increase his intake of oranges and potatoes skins up.  Unfortunately is allergic to the spray that they put on banana so he can eat those.  We will check his level today and adjust it or treated if necessary otherwise he is doing quite well exam was unremarkable system review otherwise unremarkable I will see him for follow-up 1 year renew his medication for that length of time.  Pleasure to see him again    Review of Systems   Constitutional, HEENT, cardiovascular, pulmonary, gi and gu systems are negative, except as otherwise noted.      Objective    BP (!) 144/86   Pulse 90   Ht 1.93 m (6' 4\")   Wt 96.6 kg (213 lb)   SpO2 99%   BMI 25.93 kg/m    Body mass index is 25.93 kg/m .  Physical Exam   GENERAL: healthy, alert and no distress  NECK: no adenopathy, no asymmetry, masses, or scars and thyroid normal to palpation  RESP: lungs clear to auscultation - no rales, rhonchi or wheezes  CV: regular rate and rhythm, normal S1 S2, no S3 " or S4, no murmur, click or rub, no peripheral edema and peripheral pulses strong  ABDOMEN: soft, nontender, no hepatosplenomegaly, no masses and bowel sounds normal  MS: no gross musculoskeletal defects noted, no edema

## 2022-06-02 ENCOUNTER — OFFICE VISIT (OUTPATIENT)
Dept: ORTHOPEDICS | Facility: CLINIC | Age: 42
End: 2022-06-02
Payer: COMMERCIAL

## 2022-06-02 VITALS — HEIGHT: 76 IN | WEIGHT: 214 LBS | BODY MASS INDEX: 26.06 KG/M2

## 2022-06-02 DIAGNOSIS — S83.232D COMPLEX TEAR OF MEDIAL MENISCUS OF LEFT KNEE AS CURRENT INJURY, SUBSEQUENT ENCOUNTER: ICD-10-CM

## 2022-06-02 DIAGNOSIS — M25.561 BILATERAL CHRONIC KNEE PAIN: Primary | ICD-10-CM

## 2022-06-02 DIAGNOSIS — M71.22 BAKER'S CYST, LEFT: ICD-10-CM

## 2022-06-02 DIAGNOSIS — M17.12 OSTEOARTHRITIS OF LEFT KNEE, UNSPECIFIED OSTEOARTHRITIS TYPE: ICD-10-CM

## 2022-06-02 DIAGNOSIS — G89.29 BILATERAL CHRONIC KNEE PAIN: Primary | ICD-10-CM

## 2022-06-02 DIAGNOSIS — M25.562 BILATERAL CHRONIC KNEE PAIN: Primary | ICD-10-CM

## 2022-06-02 PROCEDURE — 20611 DRAIN/INJ JOINT/BURSA W/US: CPT | Mod: LT | Performed by: FAMILY MEDICINE

## 2022-06-02 PROCEDURE — 99214 OFFICE O/P EST MOD 30 MIN: CPT | Mod: 25 | Performed by: FAMILY MEDICINE

## 2022-06-02 RX ORDER — TRIAMCINOLONE ACETONIDE 40 MG/ML
40 INJECTION, SUSPENSION INTRA-ARTICULAR; INTRAMUSCULAR
Status: DISCONTINUED | OUTPATIENT
Start: 2022-06-02 | End: 2022-12-15 | Stop reason: ALTCHOICE

## 2022-06-02 RX ORDER — ROPIVACAINE HYDROCHLORIDE 5 MG/ML
3 INJECTION, SOLUTION EPIDURAL; INFILTRATION; PERINEURAL
Status: DISCONTINUED | OUTPATIENT
Start: 2022-06-02 | End: 2022-12-15 | Stop reason: ALTCHOICE

## 2022-06-02 RX ADMIN — TRIAMCINOLONE ACETONIDE 40 MG: 40 INJECTION, SUSPENSION INTRA-ARTICULAR; INTRAMUSCULAR at 15:00

## 2022-06-02 RX ADMIN — ROPIVACAINE HYDROCHLORIDE 3 ML: 5 INJECTION, SOLUTION EPIDURAL; INFILTRATION; PERINEURAL at 15:00
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
## 2022-08-27 ENCOUNTER — HEALTH MAINTENANCE LETTER (OUTPATIENT)
Age: 42
End: 2022-08-27

## 2022-11-03 ENCOUNTER — TELEPHONE (OUTPATIENT)
Dept: FAMILY MEDICINE | Facility: CLINIC | Age: 42
End: 2022-11-03

## 2022-11-03 NOTE — TELEPHONE ENCOUNTER
Reason for call:  Other     Patient called regarding (reason for call): prescription    Additional comments: Pt calling to report increasing leg cramps and spasms. Wondering if he needs an appointment to be seen or if a potassium supplement could be called in.    Phone number to reach patient:  Cell number on file:    Telephone Information:   Mobile [phone]       Best Time:  Any    Can we leave a detailed message on this number?  YES

## 2022-11-16 NOTE — TELEPHONE ENCOUNTER
Patient is calling back to check on status of this? Patient stated this is his 3rd time calling back.

## 2022-12-06 ENCOUNTER — TELEPHONE (OUTPATIENT)
Dept: ORTHOPEDICS | Facility: CLINIC | Age: 42
End: 2022-12-06

## 2022-12-06 DIAGNOSIS — M17.12 OSTEOARTHRITIS OF LEFT KNEE, UNSPECIFIED OSTEOARTHRITIS TYPE: Primary | ICD-10-CM

## 2022-12-06 NOTE — TELEPHONE ENCOUNTER
Patient scheduled for appointment on 12/15/22 for discussion of viscosupplementation injection vs steroid injection of left knee.      Steroid  injection last completed 6/2/22.       Prior authorization referral for SynviscOne injection pended.     Please advise.    Gary Munoz ATC

## 2022-12-15 ENCOUNTER — OFFICE VISIT (OUTPATIENT)
Dept: ORTHOPEDICS | Facility: CLINIC | Age: 42
End: 2022-12-15
Payer: COMMERCIAL

## 2022-12-15 VITALS
BODY MASS INDEX: 23.75 KG/M2 | SYSTOLIC BLOOD PRESSURE: 119 MMHG | DIASTOLIC BLOOD PRESSURE: 90 MMHG | HEIGHT: 76 IN | WEIGHT: 195 LBS

## 2022-12-15 DIAGNOSIS — M25.562 CHRONIC PAIN OF LEFT KNEE: ICD-10-CM

## 2022-12-15 DIAGNOSIS — S89.92XD INJURY OF LEFT KNEE, SUBSEQUENT ENCOUNTER: ICD-10-CM

## 2022-12-15 DIAGNOSIS — G89.29 CHRONIC PAIN OF LEFT KNEE: ICD-10-CM

## 2022-12-15 DIAGNOSIS — S83.232D COMPLEX TEAR OF MEDIAL MENISCUS OF LEFT KNEE AS CURRENT INJURY, SUBSEQUENT ENCOUNTER: Primary | ICD-10-CM

## 2022-12-15 DIAGNOSIS — M17.12 OSTEOARTHRITIS OF LEFT KNEE, UNSPECIFIED OSTEOARTHRITIS TYPE: ICD-10-CM

## 2022-12-15 PROCEDURE — 99214 OFFICE O/P EST MOD 30 MIN: CPT | Mod: 25 | Performed by: FAMILY MEDICINE

## 2022-12-15 PROCEDURE — 20611 DRAIN/INJ JOINT/BURSA W/US: CPT | Mod: LT | Performed by: FAMILY MEDICINE

## 2022-12-15 RX ORDER — ROPIVACAINE HYDROCHLORIDE 5 MG/ML
3 INJECTION, SOLUTION EPIDURAL; INFILTRATION; PERINEURAL
Status: SHIPPED | OUTPATIENT
Start: 2022-12-15

## 2022-12-15 RX ORDER — TRIAMCINOLONE ACETONIDE 40 MG/ML
40 INJECTION, SUSPENSION INTRA-ARTICULAR; INTRAMUSCULAR
Status: SHIPPED | OUTPATIENT
Start: 2022-12-15

## 2022-12-15 RX ADMIN — ROPIVACAINE HYDROCHLORIDE 3 ML: 5 INJECTION, SOLUTION EPIDURAL; INFILTRATION; PERINEURAL at 17:10

## 2022-12-15 RX ADMIN — TRIAMCINOLONE ACETONIDE 40 MG: 40 INJECTION, SUSPENSION INTRA-ARTICULAR; INTRAMUSCULAR at 17:10

## 2022-12-15 NOTE — TELEPHONE ENCOUNTER
Tried reaching out to patient to discuss.  It appears that PA team is waiting on response from [employer] at this time.  Will keep appointment for the afternoon - discuss repeat steroid injection today vs waiting for approval of SynviscOne injection.    Gary Munoz, ATC
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
## 2023-01-02 ENCOUNTER — HOSPITAL ENCOUNTER (OUTPATIENT)
Dept: MRI IMAGING | Facility: CLINIC | Age: 43
Discharge: HOME | End: 2023-01-02
Attending: ORTHOPAEDIC SURGERY | Admitting: ORTHOPAEDIC SURGERY
Payer: COMMERCIAL

## 2023-01-02 DIAGNOSIS — M25.569 KNEE PAIN: ICD-10-CM

## 2023-01-02 PROCEDURE — 73721 MRI JNT OF LWR EXTRE W/O DYE: CPT | Mod: LT

## 2023-01-02 NOTE — TELEPHONE ENCOUNTER
----- Message from Jaki Eddy sent at 12/29/2022  8:58 AM CST -----  Regarding: synvisc one  Good morning,    I just heard back from Ana on this request. Synvisc one is not a preferred product for this patient. Can we switch to Durolane, Euflexxa or Gelsyn 3?    Thank you,  Jaki

## 2023-01-20 ENCOUNTER — TRANSFERRED RECORDS (OUTPATIENT)
Dept: HEALTH INFORMATION MANAGEMENT | Facility: CLINIC | Age: 43
End: 2023-01-20
Payer: COMMERCIAL

## 2023-01-23 ENCOUNTER — OFFICE VISIT (OUTPATIENT)
Dept: FAMILY MEDICINE | Facility: CLINIC | Age: 43
End: 2023-01-23
Payer: COMMERCIAL

## 2023-01-23 VITALS
DIASTOLIC BLOOD PRESSURE: 80 MMHG | TEMPERATURE: 98.6 F | HEIGHT: 75 IN | HEART RATE: 94 BPM | BODY MASS INDEX: 24.37 KG/M2 | WEIGHT: 196 LBS | OXYGEN SATURATION: 98 % | RESPIRATION RATE: 18 BRPM | SYSTOLIC BLOOD PRESSURE: 120 MMHG

## 2023-01-23 DIAGNOSIS — Z01.818 PREOP GENERAL PHYSICAL EXAM: Primary | ICD-10-CM

## 2023-01-23 DIAGNOSIS — S83.8X2D INJURY OF MENISCUS OF LEFT KNEE, SUBSEQUENT ENCOUNTER: ICD-10-CM

## 2023-01-23 DIAGNOSIS — I10 BENIGN ESSENTIAL HYPERTENSION: ICD-10-CM

## 2023-01-23 LAB
ANION GAP SERPL CALCULATED.3IONS-SCNC: 11 MMOL/L (ref 7–15)
BUN SERPL-MCNC: 32.2 MG/DL (ref 6–20)
CALCIUM SERPL-MCNC: 10.1 MG/DL (ref 8.6–10)
CHLORIDE SERPL-SCNC: 100 MMOL/L (ref 98–107)
CREAT SERPL-MCNC: 1.07 MG/DL (ref 0.67–1.17)
DEPRECATED HCO3 PLAS-SCNC: 26 MMOL/L (ref 22–29)
GFR SERPL CREATININE-BSD FRML MDRD: 88 ML/MIN/1.73M2
GLUCOSE SERPL-MCNC: 96 MG/DL (ref 70–99)
POTASSIUM SERPL-SCNC: 4.4 MMOL/L (ref 3.4–5.3)
SODIUM SERPL-SCNC: 137 MMOL/L (ref 136–145)

## 2023-01-23 PROCEDURE — 99214 OFFICE O/P EST MOD 30 MIN: CPT | Performed by: FAMILY MEDICINE

## 2023-01-23 PROCEDURE — 36415 COLL VENOUS BLD VENIPUNCTURE: CPT | Performed by: FAMILY MEDICINE

## 2023-01-23 PROCEDURE — 80048 BASIC METABOLIC PNL TOTAL CA: CPT | Performed by: FAMILY MEDICINE

## 2023-01-23 ASSESSMENT — PAIN SCALES - GENERAL: PAINLEVEL: MODERATE PAIN (5)

## 2023-01-23 NOTE — PROGRESS NOTES
[facility]  [address] [address]  Phone: [phone]  Primary Provider: Heath Singh  Pre-op Performing Provider: CARMEN FUENTES      PREOPERATIVE EVALUATION:  Today's date: 1/23/2023    Karthik Soto is a 42 year old male who presents for a preoperative evaluation.    Surgical Information:  Surgery/Procedure: left knee arthroscopic procedure.   Surgery Location: [facility]  Surgeon: Dr. Keny Sapp  Surgery Date: 2/6/2023  Time of Surgery: TBD  Where patient plans to recover: At home with family  Fax number for surgical facility: [phone]    Type of Anesthesia Anticipated: General    Assessment & Plan     The proposed surgical procedure is considered INTERMEDIATE risk.    Preop general physical exam  Mets>4   No chest pain or shortness of breath at rest or with activity.   Check BMP today.   - Basic metabolic panel  (Ca, Cl, CO2, Creat, Gluc, K, Na, BUN)    Injury of meniscus of left knee, subsequent encounter  Scheduled for surgery.     Benign essential hypertension  Lisinopril-hydrochlorothiazide 20-12.5 mg daily.  BP well controlled. Asymptomatic.  Per [place] guidelines will hold the day of surgery.    RECOMMENDATION:  APPROVAL GIVEN to proceed with proposed procedure pending review of diagnostic evaluation.    BMP testing satisfactory.     The risks, benefits and treatment options of prescribed medications or other treatments have been discussed with the patient. The patient verbalized their understanding and should call or follow up if no improvement or if they develop further problems.      Carmen Fuentes,       Subjective     HPI related to upcoming procedure:    Recent MRI   IMPRESSION:  1.  There is some signal abnormality and irregularity along the tibial undersurface of the medial meniscus posterior horn extending into the body with some inner margin truncation of the body. Findings likely relate to the previously seen tear and/or   interval  partial meniscectomy. No definite evidence for a new or displaced tear. Correlation with surgical history would be helpful.  2.  Intact lateral meniscus, cruciate, and collateral ligaments.  3.  Diffuse, mild cartilage thinning over the medial compartment without a full-thickness defect.  4.  Redemonstrated focal deep chondral fissuring over the lateral patellar facet.      Occasionally locking of the knee. Knee pain.  Knee has not been red or hot.       HTN  Lisinopril-hydrochlorothiazide 20-12.5 mg   BP well controlled on current regimen.   Asymptomatic.     Tobacco use   2-3 cigs per day, a pack per week.     Alcohol 3 drinks per night. No issues with prior withdrawal symptoms.     Mets>4   No chest pain or shortness of breath at rest or with activity.       Preop Questions 1/23/2023   1. Have you ever had a heart attack or stroke? No   2. Have you ever had surgery on your heart or blood vessels, such as a stent placement, a coronary artery bypass, or surgery on an artery in your head, neck, heart, or legs? No   3. Do you have chest pain with activity? No   4. Do you have a history of  heart failure? No   5. Do you currently have a cold, bronchitis or symptoms of other infection? No   6. Do you have a cough, shortness of breath, or wheezing? No   7. Do you or anyone in your family have previous history of blood clots? No   8. Do you or does anyone in your family have a serious bleeding problem such as prolonged bleeding following surgeries or cuts? No   9. Have you ever had problems with anemia or been told to take iron pills? No   10. Have you had any abnormal blood loss such as black, tarry or bloody stools? No   11. Have you ever had a blood transfusion? No   12. Are you willing to have a blood transfusion if it is medically needed before, during, or after your surgery? Yes   13. Have you or any of your relatives ever had problems with anesthesia? No   14. Do you have sleep apnea, excessive snoring or daytime  drowsiness? No   15. Do you have any artifical heart valves or other implanted medical devices like a pacemaker, defibrillator, or continuous glucose monitor? No   16. Do you have artificial joints? No   17. Are you allergic to latex? No     Health Care Directive:  Patient does not have a Health Care Directive or Living Will: Discussed advance care planning with patient; however, patient declined at this time.    Preoperative Review of :   reviewed - no record of controlled substances prescribed.        Review of Systems  Constitutional, neuro, ENT, endocrine, pulmonary, cardiac, gastrointestinal, genitourinary, musculoskeletal, integument and psychiatric systems are negative, except as otherwise noted.    Patient Active Problem List    Diagnosis Date Noted     Benign essential hypertension 01/23/2023     Priority: Medium     Ruptured spleen 12/28/2021     Priority: Medium     Formatting of this note might be different from the original.  not removed       Marijuana use 12/28/2021     Priority: Medium     Tobacco abuse 10/04/2011     Priority: Medium     Genital herpes      Priority: Medium     IMO update changed this record. Please review for accuracy       CARDIOVASCULAR SCREENING; LDL GOAL LESS THAN 160 04/20/2011     Priority: Medium      Past Medical History:   Diagnosis Date     Genital herpes      Kidney stone 2011     History reviewed. No pertinent surgical history.  Current Outpatient Medications   Medication Sig Dispense Refill     Acetaminophen (TYLENOL ARTHRITIS EXT RELIEF OR) Take by mouth 2 times daily       lisinopril-hydrochlorothiazide (ZESTORETIC) 20-12.5 MG tablet Take 1 tablet by mouth daily 90 tablet 3     valACYclovir (VALTREX) 500 MG tablet Take 1 tablet (500 mg) by mouth daily Fill generic if able 30 tablet 11       Allergies   Allergen Reactions     Pcn [Penicillins]         Social History     Tobacco Use     Smoking status: Every Day     Years: 25.00     Types: Cigarettes      "Smokeless tobacco: Current     Tobacco comments:     2 ciggs a day   Substance Use Topics     Alcohol use: Yes     Comment: occasional       History   Drug Use No         Objective     /80 (BP Location: Left arm, Patient Position: Chair, Cuff Size: Adult Regular)   Pulse 94   Temp 98.6  F (37  C) (Tympanic)   Resp 18   Ht 1.898 m (6' 2.71\")   Wt 88.9 kg (196 lb)   SpO2 98%   BMI 24.69 kg/m      Physical Exam    GENERAL APPEARANCE: healthy, alert and no distress     EYES: EOMI,  PERRL     HENT: ear canals and TM's normal and nose and mouth without ulcers or lesions     NECK: no adenopathy, no asymmetry, masses, or scars and thyroid normal to palpation     RESP: lungs clear to auscultation - no rales, rhonchi or wheezes     CV: regular rates and rhythm, normal S1 S2, no S3 or S4 and no murmur, click or rub     ABDOMEN:  soft, nontender, no HSM or masses and bowel sounds normal     MS: extremities normal- no gross deformities noted, no evidence of inflammation in joints, FROM in all extremities.     SKIN: no suspicious lesions or rashes     NEURO: Normal strength and tone, sensory exam grossly normal, mentation intact and speech normal     PSYCH: mentation appears normal. and affect normal/bright     LYMPHATICS: No cervical adenopathy    Recent Labs   Lab Test 03/31/22  1425 12/15/21  0937   HGB  --  15.8   PLT  --  275   NA  --  138   POTASSIUM 4.2 4.7   CR  --  1.00        Diagnostics:  Labs pending at this time.  Results will be reviewed when available. (BMP)  No EKG required, no history of coronary heart disease, significant arrhythmia, peripheral arterial disease or other structural heart disease.     Latest Reference Range & Units 01/23/23 14:34   Sodium 136 - 145 mmol/L 137   Potassium 3.4 - 5.3 mmol/L 4.4   Chloride 98 - 107 mmol/L 100   Carbon Dioxide (CO2) 22 - 29 mmol/L 26   Urea Nitrogen 6.0 - 20.0 mg/dL 32.2 (H)   Creatinine 0.67 - 1.17 mg/dL 1.07   GFR Estimate >60 mL/min/1.73m2 88   Calcium " 8.6 - 10.0 mg/dL 10.1 (H)   Anion Gap 7 - 15 mmol/L 11   Glucose 70 - 99 mg/dL 96   (H): Data is abnormally high      Revised Cardiac Risk Index (RCRI):  The patient has the following serious cardiovascular risks for perioperative complications:   - No serious cardiac risks = 0 points     RCRI Interpretation: 0 points: Class I (very low risk - 0.4% complication rate)           Signed Electronically by: Remington Mcallister DO  Copy of this evaluation report is provided to requesting physician.

## 2023-01-23 NOTE — PATIENT INSTRUCTIONS
Lab work today.     Hold lisinopril-hydrochlorothiazide on day of surgery per [place] Guidelines.       For informational purposes only. Not to replace the advice of your health care provider. Copyright   ,  [employer] [facility]. All rights reserved. Clinically reviewed by Nicole Esposito MD. [employer] [id number] - REV .  Preparing for Your Surgery  Getting started  A nurse will call you to review your health history and instructions. They will give you an arrival time based on your scheduled surgery time. Please be ready to share:  Your doctor's clinic name and phone number  Your medical, surgical, and anesthesia history  A list of allergies and sensitivities  A list of medicines, including herbal treatments and over-the-counter drugs  Whether the patient has a legal guardian (ask how to send us the papers in advance)  Please tell us if you're pregnant--or if there's any chance you might be pregnant. Some surgeries may injure a fetus (unborn baby), so they require a pregnancy test. Surgeries that are safe for a fetus don't always need a test, and you can choose whether to have one.   If you have a child who's having surgery, please ask for a copy of Preparing for Your Child's Surgery.    Preparing for surgery  Within 10 to 30 days of surgery: Have a pre-op exam (sometimes called an H&P, or History and Physical). This can be done at a clinic or pre-operative center.  If you're having a , you may not need this exam. Talk to your care team.  At your pre-op exam, talk to your care team about all medicines you take. If you need to stop any medicines before surgery, ask when to start taking them again.  We do this for your safety. Many medicines can make you bleed too much during surgery. Some change how well surgery (anesthesia) drugs work.  Call your insurance company to let them know you're having surgery. (If you don't have insurance, call [phone].)  Call your clinic if there's any change in  your health. This includes signs of a cold or flu (sore throat, runny nose, cough, rash, fever). It also includes a scrape or scratch near the surgery site.  If you have questions on the day of surgery, call your hospital or surgery center.  Eating and drinking guidelines  For your safety: Unless your surgeon tells you otherwise, follow the guidelines below.  Eat and drink as usual until 8 hours before you arrive for surgery. After that, no food or milk.  Drink clear liquids until 2 hours before you arrive. These are liquids you can see through, like water, Gatorade, and Propel Water. They also include plain black coffee and tea (no cream or milk), candy, and breath mints. You can spit out gum when you arrive.  If you drink alcohol: Stop drinking it the night before surgery.  If your care team tells you to take medicine on the morning of surgery, it's okay to take it with a sip of water.  Preventing infection  Shower or bathe the night before and morning of your surgery. Follow the instructions your clinic gave you. (If no instructions, use regular soap.)  Don't shave or clip hair near your surgery site. We'll remove the hair if needed.  Don't smoke or vape the morning of surgery. You may chew nicotine gum up to 2 hours before surgery. A nicotine patch is okay.  Note: Some surgeries require you to completely quit smoking and nicotine. Check with your surgeon.  Your care team will make every effort to keep you safe from infection. We will:  Clean our hands often with soap and water (or an alcohol-based hand rub).  Clean the skin at your surgery site with a special soap that kills germs.  Give you a special gown to keep you warm. (Cold raises the risk of infection.)  Wear special hair covers, masks, gowns and gloves during surgery.  Give antibiotic medicine, if prescribed. Not all surgeries need antibiotics.  What to bring on the day of surgery  Photo ID and insurance card  Copy of your health care directive, if you  have one  Glasses and hearing aids (bring cases)  You can't wear contacts during surgery  Inhaler and eye drops, if you use them (tell us about these when you arrive)  CPAP machine or breathing device, if you use them  A few personal items, if spending the night  If you have . . .  A pacemaker, ICD (cardiac defibrillator) or other implant: Bring the ID card.  An implanted stimulator: Bring the remote control.  A legal guardian: Bring a copy of the certified (court-stamped) guardianship papers.  Please remove any jewelry, including body piercings. Leave jewelry and other valuables at home.  If you're going home the day of surgery  You must have a responsible adult drive you home. They should stay with you overnight as well.  If you don't have someone to stay with you, and you aren't safe to go home alone, we may keep you overnight. Insurance often won't pay for this.  After surgery  If it's hard to control your pain or you need more pain medicine, please call your surgeon's office.  Questions?   If you have any questions for your care team, list them here: _________________________________________________________________________________________________________________________________________________________________________ ____________________________________ ____________________________________ ____________________________________

## 2023-01-23 NOTE — LETTER
[facility]  [address] [address]  [phone]  Dept: [phone]      1/23/2023    Re: Karthik Soto      TO WHOM IT MAY CONCERN:    Karthik Soto  was seen on 1/23/2023.  Please excuse him from work due to medical injury.     Brandy Mcallister DO  [facility]

## 2023-02-22 ENCOUNTER — TRANSFERRED RECORDS (OUTPATIENT)
Dept: HEALTH INFORMATION MANAGEMENT | Facility: CLINIC | Age: 43
End: 2023-02-22
Payer: COMMERCIAL

## 2023-03-22 ENCOUNTER — TRANSFERRED RECORDS (OUTPATIENT)
Dept: HEALTH INFORMATION MANAGEMENT | Facility: CLINIC | Age: 43
End: 2023-03-22
Payer: COMMERCIAL

## 2023-04-23 ENCOUNTER — HEALTH MAINTENANCE LETTER (OUTPATIENT)
Age: 43
End: 2023-04-23

## 2023-04-28 NOTE — LETTER
January 30, 2018      Karthik WITT Soto  [address]   [place] [address]        To Whom It May Concern:      Karthik Soto was treated in our clinic on 01/16/2018 to return to work when feels better.         Sincerely,        Mio Levine MD            
January 30, 2018      Karthik Soto  [address]   [place] [place] [address]        To Whom It May Concern:      Karthik Soto was treated in our clinic on 01/16/2018.         Sincerely,        Mio Levine MD          
preop wash done

## 2023-06-12 ENCOUNTER — OFFICE VISIT (OUTPATIENT)
Dept: FAMILY MEDICINE | Facility: CLINIC | Age: 43
End: 2023-06-12
Payer: COMMERCIAL

## 2023-06-12 VITALS
BODY MASS INDEX: 23.25 KG/M2 | TEMPERATURE: 98.9 F | HEIGHT: 75 IN | DIASTOLIC BLOOD PRESSURE: 70 MMHG | RESPIRATION RATE: 12 BRPM | HEART RATE: 88 BPM | WEIGHT: 187 LBS | SYSTOLIC BLOOD PRESSURE: 100 MMHG

## 2023-06-12 DIAGNOSIS — Z20.2 EXPOSURE TO STD: ICD-10-CM

## 2023-06-12 DIAGNOSIS — Z11.59 NEED FOR HEPATITIS C SCREENING TEST: ICD-10-CM

## 2023-06-12 DIAGNOSIS — A60.01 HERPES SIMPLEX INFECTION OF PENIS: ICD-10-CM

## 2023-06-12 DIAGNOSIS — I10 BENIGN ESSENTIAL HYPERTENSION: Primary | ICD-10-CM

## 2023-06-12 DIAGNOSIS — Z11.4 SCREENING FOR HIV (HUMAN IMMUNODEFICIENCY VIRUS): ICD-10-CM

## 2023-06-12 LAB
C TRACH DNA SPEC QL NAA+PROBE: NEGATIVE
HBV SURFACE AG SERPL QL IA: NONREACTIVE
HSV1 IGG SERPL QL IA: 0.16 INDEX
HSV1 IGG SERPL QL IA: ABNORMAL
HSV2 IGG SERPL QL IA: 12.5 INDEX
HSV2 IGG SERPL QL IA: ABNORMAL
N GONORRHOEA DNA SPEC QL NAA+PROBE: NEGATIVE
T PALLIDUM AB SER QL: NONREACTIVE

## 2023-06-12 PROCEDURE — 87491 CHLMYD TRACH DNA AMP PROBE: CPT | Performed by: FAMILY MEDICINE

## 2023-06-12 PROCEDURE — 36415 COLL VENOUS BLD VENIPUNCTURE: CPT | Performed by: FAMILY MEDICINE

## 2023-06-12 PROCEDURE — 86695 HERPES SIMPLEX TYPE 1 TEST: CPT | Performed by: FAMILY MEDICINE

## 2023-06-12 PROCEDURE — 87340 HEPATITIS B SURFACE AG IA: CPT | Performed by: FAMILY MEDICINE

## 2023-06-12 PROCEDURE — 87591 N.GONORRHOEAE DNA AMP PROB: CPT | Performed by: FAMILY MEDICINE

## 2023-06-12 PROCEDURE — 86696 HERPES SIMPLEX TYPE 2 TEST: CPT | Performed by: FAMILY MEDICINE

## 2023-06-12 PROCEDURE — 86780 TREPONEMA PALLIDUM: CPT | Performed by: FAMILY MEDICINE

## 2023-06-12 PROCEDURE — 99214 OFFICE O/P EST MOD 30 MIN: CPT | Performed by: FAMILY MEDICINE

## 2023-06-12 RX ORDER — VALACYCLOVIR HYDROCHLORIDE 500 MG/1
TABLET, FILM COATED ORAL
Qty: 30 TABLET | Refills: 0 | Status: SHIPPED | OUTPATIENT
Start: 2023-06-12 | End: 2023-07-22

## 2023-06-12 RX ORDER — LISINOPRIL AND HYDROCHLOROTHIAZIDE 12.5; 2 MG/1; MG/1
1 TABLET ORAL DAILY
Qty: 90 TABLET | Refills: 3 | Status: SHIPPED | OUTPATIENT
Start: 2023-06-12 | End: 2024-06-26

## 2023-06-12 ASSESSMENT — ENCOUNTER SYMPTOMS
NEUROLOGICAL NEGATIVE: 1
ENDOCRINE NEGATIVE: 1
RESPIRATORY NEGATIVE: 1
CARDIOVASCULAR NEGATIVE: 1
HEMATOLOGIC/LYMPHATIC NEGATIVE: 1
CONSTITUTIONAL NEGATIVE: 1
PSYCHIATRIC NEGATIVE: 1
GASTROINTESTINAL NEGATIVE: 1
MUSCULOSKELETAL NEGATIVE: 1
HYPERTENSION: 1
EYES NEGATIVE: 1
ALLERGIC/IMMUNOLOGIC NEGATIVE: 1

## 2023-06-12 ASSESSMENT — PAIN SCALES - GENERAL: PAINLEVEL: NO PAIN (0)

## 2023-06-12 NOTE — PROGRESS NOTES
"  Assessment & Plan     (I10) Benign essential hypertension  (primary encounter diagnosis)  Comment: Blood pressure within normal limits.   Plan: lisinopril-hydrochlorothiazide (ZESTORETIC)         20-12.5 MG tablet      (Z11.4) Screening for HIV (human immunodeficiency virus)  Comment: Patient will be notified of results  Plan: Herpes Simplex Virus 1 and 2 IgG        (Z11.59) Need for hepatitis C screening test  Comment: Patient will be notified of results  Plan: Herpes Simplex Virus 1 and 2 IgG            (Z20.2) Exposure to STD  Comment: Patient will be notified of results   Plan: Herpes Simplex Virus 1 and 2 IgG, NEISSERIA         GONORRHOEA PCR, CHLAMYDIA TRACHOMATIS PCR,         Treponema Abs w Reflex to RPR and Titer,         Hepatitis B surface antigen             Nicotine/Tobacco Cessation:  He reports that he has been smoking cigarettes. He has never used smokeless tobacco.  Nicotine/Tobacco Cessation Plan:         FUTURE APPOINTMENTS:       - Follow-up office or E-visit as needed.    Gogo Nagy MD  [facility]    Cassi Angeles is a 42 year old, presenting for the following health issues:    42 yr old male here for std check. Patient also has high blood pressure and needs refills on his medication. He has had no side effect so far on the medication. He says he thinks he has a herpes break out. He has been exposed to persons with herpes. Patient states that he has a sore on the penis presently. It has not drained. He will like to be checked for \"everything\" in terms of STD.   Hypertension and STD        6/12/2023    10:44 AM   Additional Questions   Roomed by Genie MONROY   Accompanied by self     Hypertension     STD    History of Present Illness       Hypertension: He presents for follow up of hypertension.  He does not check blood pressure  regularly outside of the clinic. Outside blood pressures have been over 140/90. He does not follow a low salt diet.     Reason for " "visit:  Std test    He eats 0-1 servings of fruits and vegetables daily.He consumes 2 sweetened beverage(s) daily.He exercises with enough effort to increase his heart rate 10 to 19 minutes per day.  He exercises with enough effort to increase his heart rate 4 days per week.   He is taking medications regularly.           Review of Systems   Constitutional: Negative.    HENT: Negative.    Eyes: Negative.    Respiratory: Negative.    Cardiovascular: Negative.    Gastrointestinal: Negative.    Endocrine: Negative.    Genitourinary: Negative.    Musculoskeletal: Negative.    Skin: Negative.    Allergic/Immunologic: Negative.    Neurological: Negative.    Hematological: Negative.    Psychiatric/Behavioral: Negative.             Objective    /70 (BP Location: Left arm, Patient Position: Sitting, Cuff Size: Adult Regular)   Pulse 88   Temp 98.9  F (37.2  C) (Tympanic)   Resp 12   Ht 1.899 m (6' 2.75\")   Wt 84.8 kg (187 lb)   BMI 23.53 kg/m    Body mass index is 23.53 kg/m .  Physical Exam   GENERAL: healthy, alert and no distress  EYES: Eyes grossly normal to inspection, PERRL and conjunctivae and sclerae normal  HENT: ear canals and TM's normal, nose and mouth without ulcers or lesions  NECK: no adenopathy, no asymmetry, masses, or scars and thyroid normal to palpation  RESP: lungs clear to auscultation - no rales, rhonchi or wheezes  CV: regular rate and rhythm, normal S1 S2, no S3 or S4, no murmur, click or rub, no peripheral edema and peripheral pulses strong  ABDOMEN: soft, nontender, no hepatosplenomegaly, no masses and bowel sounds normal  MS: no gross musculoskeletal defects noted, no edema                "

## 2023-06-12 NOTE — TELEPHONE ENCOUNTER
Routing refill request to provider for review/approval because:  Patient needs to be seen because it has been more than 1 year since last office visit with PCP.    Pending Prescriptions:                       Disp   Refills    valACYclovir (VALTREX) 500 MG tablet [Phar*30 tab*0        Sig: Take 1 tablet by mouth once daily    Last Written Prescription Date:  12/15/2021  Last Fill Quantity: 30 tabs,  # refills: 11   Last office visitwith prescribing provider: 3/31/2022   Future Office Visit:   Appointments in Next Year      Jun 12, 2023 11:00 AM  (Arrive by 10:40 AM)  Provider Visit with Gogo Nagy MD  [facility] ([facility]  Arrive at: Clinic A [phone]          Jesusita Sapp RN, BSN  [facility]

## 2023-06-13 NOTE — RESULT ENCOUNTER NOTE
Please inform patient that test result was negative except the herpes was positive.   Thank you.     Gogo Nagy M.D.

## 2023-07-22 ENCOUNTER — MYC REFILL (OUTPATIENT)
Dept: FAMILY MEDICINE | Facility: CLINIC | Age: 43
End: 2023-07-22
Payer: COMMERCIAL

## 2023-07-22 DIAGNOSIS — A60.01 HERPES SIMPLEX INFECTION OF PENIS: ICD-10-CM

## 2023-07-22 RX ORDER — VALACYCLOVIR HYDROCHLORIDE 500 MG/1
500 TABLET, FILM COATED ORAL DAILY
Qty: 30 TABLET | Refills: 0 | Status: SHIPPED | OUTPATIENT
Start: 2023-07-22 | End: 2023-08-22

## 2023-07-23 NOTE — TELEPHONE ENCOUNTER
"Last Written Prescription Date:  6/12/23  Last Fill Quantity: 30,  # refills: 0   Last office visit provider:   6/12/23    Requested Prescriptions   Pending Prescriptions Disp Refills    valACYclovir (VALTREX) 500 MG tablet 30 tablet 0     Sig: Take 1 tablet (500 mg) by mouth daily       Antivirals for Herpes Protocol Failed - 7/22/2023  5:02 PM        Failed - Recent (12 mo) or future (30 days) visit within the authorizing provider's specialty     Patient has had an office visit with the authorizing provider or a provider within the authorizing providers department within the previous 12 mos or has a future within next 30 days. See \"Patient Info\" tab in inbasket, or \"Choose Columns\" in Meds & Orders section of the refill encounter.              Passed - Patient is age 12 or older        Passed - Medication is active on med list        Passed - Normal serum creatinine on file in past 12 months     Recent Labs   Lab Test 01/23/23  1434   CR 1.07       Ok to refill medication if creatinine is low               Terrie Vasquez RN 07/22/23 8:11 PM  "

## 2023-08-22 ENCOUNTER — MYC REFILL (OUTPATIENT)
Dept: FAMILY MEDICINE | Facility: CLINIC | Age: 43
End: 2023-08-22
Payer: COMMERCIAL

## 2023-08-22 DIAGNOSIS — A60.01 HERPES SIMPLEX INFECTION OF PENIS: ICD-10-CM

## 2023-08-22 NOTE — TELEPHONE ENCOUNTER
"Routing refill request to provider for review/approval because:  Provider review    Last Written Prescription Date:  7/22/23  Last Fill Quantity: 30,  # refills: 0   Last office visit provider:  6/12/23, Dr. Nagy     Requested Prescriptions   Pending Prescriptions Disp Refills    valACYclovir (VALTREX) 500 MG tablet 30 tablet 0     Sig: Take 1 tablet (500 mg) by mouth daily       Antivirals for Herpes Protocol Failed - 8/22/2023 10:26 AM        Failed - Recent (12 mo) or future (30 days) visit within the authorizing provider's specialty     Patient has had an office visit with the authorizing provider or a provider within the authorizing providers department within the previous 12 mos or has a future within next 30 days. See \"Patient Info\" tab in inbasket, or \"Choose Columns\" in Meds & Orders section of the refill encounter.              Passed - Patient is age 12 or older        Passed - Medication is active on med list        Passed - Normal serum creatinine on file in past 12 months     Recent Labs   Lab Test 01/23/23  1434   CR 1.07       Ok to refill medication if creatinine is low               Radha Huizar RN 08/22/23 4:35 PM  "

## 2023-08-23 RX ORDER — VALACYCLOVIR HYDROCHLORIDE 500 MG/1
500 TABLET, FILM COATED ORAL DAILY
Qty: 30 TABLET | Refills: 0 | Status: SHIPPED | OUTPATIENT
Start: 2023-08-23 | End: 2023-09-22

## 2023-09-22 ENCOUNTER — OFFICE VISIT (OUTPATIENT)
Dept: FAMILY MEDICINE | Facility: CLINIC | Age: 43
End: 2023-09-22
Payer: COMMERCIAL

## 2023-09-22 ENCOUNTER — ANCILLARY PROCEDURE (OUTPATIENT)
Dept: GENERAL RADIOLOGY | Facility: CLINIC | Age: 43
End: 2023-09-22
Attending: NURSE PRACTITIONER
Payer: COMMERCIAL

## 2023-09-22 VITALS
HEART RATE: 84 BPM | HEIGHT: 75 IN | BODY MASS INDEX: 23.2 KG/M2 | WEIGHT: 186.6 LBS | OXYGEN SATURATION: 98 % | DIASTOLIC BLOOD PRESSURE: 83 MMHG | RESPIRATION RATE: 24 BRPM | SYSTOLIC BLOOD PRESSURE: 127 MMHG | TEMPERATURE: 99.1 F

## 2023-09-22 DIAGNOSIS — M79.641 PAIN OF RIGHT HAND: ICD-10-CM

## 2023-09-22 DIAGNOSIS — A60.01 HERPES SIMPLEX INFECTION OF PENIS: ICD-10-CM

## 2023-09-22 DIAGNOSIS — S66.911A HAND STRAIN, RIGHT, INITIAL ENCOUNTER: Primary | ICD-10-CM

## 2023-09-22 PROCEDURE — 99213 OFFICE O/P EST LOW 20 MIN: CPT | Performed by: NURSE PRACTITIONER

## 2023-09-22 PROCEDURE — 73130 X-RAY EXAM OF HAND: CPT | Mod: TC | Performed by: RADIOLOGY

## 2023-09-22 RX ORDER — VALACYCLOVIR HYDROCHLORIDE 500 MG/1
500 TABLET, FILM COATED ORAL DAILY
Qty: 90 TABLET | Refills: 3 | Status: SHIPPED | OUTPATIENT
Start: 2023-09-22

## 2023-09-22 ASSESSMENT — PAIN SCALES - GENERAL: PAINLEVEL: SEVERE PAIN (6)

## 2023-09-22 NOTE — PROGRESS NOTES
Assessment & Plan     Hand strain, right, initial encounter  Patient presents with an approximate 1 day history of right hand pain, with limited movement without pain.  Did notice swelling last evening.  Does not recall any specific trauma or injury, however did lift a cast iron skillet yesterday and did not have problems with the wrist before this.  Did hurt right after lifting.  To note he was a  for 25 years with a lot of repetitive motions with his hands.  Town arthritis this morning did not seem to help, did ice last night as well.  Most consistent with strain, did obtain x-ray of hand as below, independently read by this provider and reviewed with patient.  No abnormalities noted.  Advised patient to use wrist brace, wearing is much as able especially with activity for 4 to 6 weeks.  Okay to take off for showers and if resting and gently moving rest and fingers.  Continue ice, Tylenol as needed as well as Voltaren gel as below.  Patient to follow-up in 4 to 6 weeks if symptoms not improving or worsening.  - Wrist/Arm Supplies Order Wrist Brace; Right; with thumb spica    Pain of right hand  Pain of right hand as above.  - XR Hand Right G/E 3 Views; Future  - diclofenac (VOLTAREN) 1 % topical gel; Apply 4 g topically 4 times daily as needed for moderate pain  - Wrist/Arm Supplies Order Wrist Brace; Right; with thumb spica    Herpes simplex infection of penis  Chronic, stable.  Continue Valtrex as prescribed.  - valACYclovir (VALTREX) 500 MG tablet; Take 1 tablet (500 mg) by mouth daily             See Patient Instructions After discussion with patient, patient verbalizes and agreeable to receive AVS instructions via My Chart, not printed today     Debbie Sapp, LENORE, APRN-CNP   [facility]    Cassi Angeles is a 43 year old, presenting for the following health issues:  Musculoskeletal Problem        9/22/2023     1:43 PM   Additional Questions   Roomed by Rubi  "      History of Present Illness       Reason for visit:  Pain in right hand  Symptom onset:  1-3 days ago  Symptoms include:  Can`t move it certin ways  Symptom intensity:  Severe  Symptom progression:  Staying the same  Had these symptoms before:  No  What makes it worse:  Moving it  What makes it better:  No    He eats 2-3 servings of fruits and vegetables daily.He consumes 2 sweetened beverage(s) daily.He exercises with enough effort to increase his heart rate 30 to 60 minutes per day.  He exercises with enough effort to increase his heart rate 4 days per week.   He is taking medications regularly.     Possibly from lifting cast iron skillet yesterday - cooks with it everyday. Didn't hurt before this. Hurt right after lifting   Has been a  all his life - 23 years  Not getting any better, but no worse  Took some Tylenol arthritis this morning - didn't seem to help  Iced last night, didn't help much   Did swell up a little last night         Review of Systems   Constitutional, HEENT, cardiovascular, pulmonary, gi and gu systems are negative, except as otherwise noted.      Objective    /83   Pulse 84   Temp 99.1  F (37.3  C)   Resp 24   Ht 1.899 m (6' 2.75\")   Wt 84.6 kg (186 lb 9.6 oz)   SpO2 98%   BMI 23.48 kg/m    Body mass index is 23.48 kg/m .  Physical Exam   GENERAL APPEARANCE: healthy, alert, and no distress  MS: extremities normal- no gross deformities noted, peripheral pulses normal, and normal range of motion of right wrist with pain, generalized tenderness noted without swelling or erythema  SKIN: no suspicious lesions or rashes  NEURO: Normal strength and tone, mentation intact, and speech normal  PSYCH: mentation appears normal and affect normal/bright    Diagnostic Test Results:  Xray -right hand, independently read by this provider, not noting any acute fractures or dislocations ~will await final radiologist read                DME (Durable Medical Equipment) Orders and " Documentation  Orders Placed This Encounter   Procedures    Wrist/Arm Supplies Order Wrist Brace; Right; with thumb spica        The patient was assessed and it was determined the patient is in need of the following listed DME Supplies/Equipment. Please complete supporting documentation below to demonstrate medical necessity.        Chart documentation with Dragon Voice recognition Software. Although reviewed after completion, some words and grammatical errors may remain.

## 2023-09-22 NOTE — LETTER
[facility]  [address] [place] [id number]  [phone]          September 22, 2023    Karthik MIRYAM Soto                                                                                                                     [id number] [facility] [id number]          To Whom it May Concern,    Karthik was seen in clinic today and will need to be excused from work today 9/22/2023.         Sincerely,       Debbie Sapp, DNP, APRN-CNP

## 2023-09-23 ENCOUNTER — HEALTH MAINTENANCE LETTER (OUTPATIENT)
Age: 43
End: 2023-09-23

## 2023-10-09 NOTE — PATIENT INSTRUCTIONS
Hand strain, right, initial encounter  Patient presents with an approximate 1 day history of right hand pain, with limited movement without pain.  Did notice swelling last evening.  Does not recall any specific trauma or injury, however did lift a cast iron skillet yesterday and did not have problems with the wrist before this.  Did hurt right after lifting.  To note he was a  for 25 years with a lot of repetitive motions with his hands.  Town arthritis this morning did not seem to help, did ice last night as well.  Most consistent with strain, did obtain x-ray of hand as below, independently read by this provider and reviewed with patient.  No abnormalities noted.  Advised patient to use wrist brace, wearing is much as able especially with activity for 4 to 6 weeks.  Okay to take off for showers and if resting and gently moving rest and fingers.  Continue ice, Tylenol as needed as well as Voltaren gel as below.  Patient to follow-up in 4 to 6 weeks if symptoms not improving or worsening.  - Wrist/Arm Supplies Order Wrist Brace; Right; with thumb spica    Pain of right hand  Pain of right hand as above.  - XR Hand Right G/E 3 Views; Future  - diclofenac (VOLTAREN) 1 % topical gel; Apply 4 g topically 4 times daily as needed for moderate pain  - Wrist/Arm Supplies Order Wrist Brace; Right; with thumb spica    Herpes simplex infection of penis  Chronic, stable.  Continue Valtrex as prescribed.  - valACYclovir (VALTREX) 500 MG tablet; Take 1 tablet (500 mg) by mouth daily

## 2024-03-06 ENCOUNTER — TELEPHONE (OUTPATIENT)
Dept: FAMILY MEDICINE | Facility: CLINIC | Age: 44
End: 2024-03-06
Payer: COMMERCIAL

## 2024-03-06 NOTE — TELEPHONE ENCOUNTER
Patient Quality Outreach    Patient is due for the following:   Colon Cancer Screening  Physical Preventive Adult Physical    Next Steps:   Schedule a Adult Preventative    Type of outreach:    Sent Gliph message.      Questions for provider review:    None           Vaishnavi Talamantes CMA

## 2024-05-26 DIAGNOSIS — I10 BENIGN ESSENTIAL HYPERTENSION: ICD-10-CM

## 2024-05-28 NOTE — TELEPHONE ENCOUNTER
Requested Prescriptions   Pending Prescriptions Disp Refills    lisinopril-hydrochlorothiazide (ZESTORETIC) 20-12.5 MG tablet [Pharmacy Med Name: Lisinopril-hydroCHLOROthiazide 20-12.5 MG Oral Tablet] 90 tablet 0     Sig: Take 1 tablet by mouth once daily       Diuretics (Including Combos) Protocol Failed - 5/26/2024  7:02 AM        Failed - Has GFR on file in past 12 months and most recent value is normal        Passed - Blood pressure under 140/90 in past 12 months     BP Readings from Last 3 Encounters:   09/22/23 127/83   06/12/23 100/70   01/23/23 120/80       No data recorded            Passed - Medication is active on med list        Passed - Medication indicated for associated diagnosis     Medication is associated with one or more of the following diagnoses:     Edema   Hypertension   Heart Failure   Meniere's Disease          Passed - Recent (12 mo) or future (90 days) visit within the authorizing provider's specialty     The patient must have completed an in-person or virtual visit within the past 12 months or has a future visit scheduled within the next 90 days with the authorizing provider s specialty.  Urgent care and e-visits do not quality as an office visit for this protocol.          Passed - Patient is age 18 or older       ACE Inhibitors (Including Combos) Protocol Failed - 5/26/2024  7:02 AM        Failed - Has GFR on file in past 12 months and most recent value is normal        Failed - Normal serum potassium on file in past 12 months     Recent Labs   Lab Test 01/23/23  1434   POTASSIUM 4.4             Passed - Blood pressure under 140/90 in past 12 months- Clinicial or Patient Reported     BP Readings from Last 3 Encounters:   09/22/23 127/83   06/12/23 100/70   01/23/23 120/80       No data recorded            Passed - Medication is active on med list        Passed - Medication indicated for associated diagnosis     Medication is associated with one or more of the following diagnoses:      Chronic Kidney Disease (CKD)   Coronary Artery Disease (CAD)   Diabetes   Heart Failure (HF)   Hypertension (HTN)   Nephropathy            Passed - Recent (12 mo) or future (90 days) visit within the authorizing provider's specialty     The patient must have completed an in-person or virtual visit within the past 12 months or has a future visit scheduled within the next 90 days with the authorizing provider s specialty.  Urgent care and e-visits do not quality as an office visit for this protocol.          Passed - Patient is age 18 or older

## 2024-05-29 RX ORDER — LISINOPRIL AND HYDROCHLOROTHIAZIDE 12.5; 2 MG/1; MG/1
1 TABLET ORAL DAILY
Qty: 90 TABLET | Refills: 0 | OUTPATIENT
Start: 2024-05-29

## 2024-06-26 DIAGNOSIS — I10 BENIGN ESSENTIAL HYPERTENSION: ICD-10-CM

## 2024-06-26 NOTE — TELEPHONE ENCOUNTER
Pt requesting refill to get him by till his next appt which is scheduled 7/8/2024. Debbie cancelled his appt today.

## 2024-06-27 RX ORDER — LISINOPRIL AND HYDROCHLOROTHIAZIDE 12.5; 2 MG/1; MG/1
1 TABLET ORAL DAILY
Qty: 90 TABLET | Refills: 0 | Status: SHIPPED | OUTPATIENT
Start: 2024-06-27 | End: 2024-09-23

## 2024-06-27 NOTE — TELEPHONE ENCOUNTER
Ernestina refill given x 1, patient has appointment on 7/8/24 with Debbie Voss DNP.  Prescription approved per [facility] Refill Protocol.  Julie Behrendt RN

## 2024-09-21 DIAGNOSIS — I10 BENIGN ESSENTIAL HYPERTENSION: ICD-10-CM

## 2024-09-23 RX ORDER — LISINOPRIL AND HYDROCHLOROTHIAZIDE 12.5; 2 MG/1; MG/1
1 TABLET ORAL DAILY
Qty: 30 TABLET | Refills: 0 | Status: SHIPPED | OUTPATIENT
Start: 2024-09-23

## 2024-11-08 ENCOUNTER — MYC REFILL (OUTPATIENT)
Dept: FAMILY MEDICINE | Facility: CLINIC | Age: 44
End: 2024-11-08
Payer: COMMERCIAL

## 2024-11-08 DIAGNOSIS — A60.01 HERPES SIMPLEX INFECTION OF PENIS: ICD-10-CM

## 2024-11-08 RX ORDER — VALACYCLOVIR HYDROCHLORIDE 500 MG/1
500 TABLET, FILM COATED ORAL DAILY
Qty: 90 TABLET | Refills: 0 | Status: SHIPPED | OUTPATIENT
Start: 2024-11-08

## 2024-11-16 ENCOUNTER — HEALTH MAINTENANCE LETTER (OUTPATIENT)
Age: 44
End: 2024-11-16

## 2024-12-17 ENCOUNTER — MYC REFILL (OUTPATIENT)
Dept: FAMILY MEDICINE | Facility: CLINIC | Age: 44
End: 2024-12-17
Payer: COMMERCIAL

## 2024-12-17 DIAGNOSIS — I10 BENIGN ESSENTIAL HYPERTENSION: ICD-10-CM

## 2024-12-19 RX ORDER — LISINOPRIL AND HYDROCHLOROTHIAZIDE 12.5; 2 MG/1; MG/1
1 TABLET ORAL DAILY
Qty: 30 TABLET | Refills: 0 | Status: SHIPPED | OUTPATIENT
Start: 2024-12-19

## 2025-01-21 ENCOUNTER — MYC REFILL (OUTPATIENT)
Dept: FAMILY MEDICINE | Facility: CLINIC | Age: 45
End: 2025-01-21
Payer: COMMERCIAL

## 2025-01-21 DIAGNOSIS — I10 BENIGN ESSENTIAL HYPERTENSION: ICD-10-CM

## 2025-01-22 RX ORDER — LISINOPRIL AND HYDROCHLOROTHIAZIDE 12.5; 2 MG/1; MG/1
1 TABLET ORAL DAILY
Qty: 30 TABLET | Refills: 0 | Status: SHIPPED | OUTPATIENT
Start: 2025-01-22